# Patient Record
Sex: MALE | Race: BLACK OR AFRICAN AMERICAN | NOT HISPANIC OR LATINO | Employment: FULL TIME | RURAL
[De-identification: names, ages, dates, MRNs, and addresses within clinical notes are randomized per-mention and may not be internally consistent; named-entity substitution may affect disease eponyms.]

---

## 2022-05-20 ENCOUNTER — CLINICAL SUPPORT (OUTPATIENT)
Dept: PRIMARY CARE CLINIC | Facility: CLINIC | Age: 57
End: 2022-05-20

## 2022-05-20 DIAGNOSIS — Z02.4 ENCOUNTER FOR COMMERCIAL DRIVER MEDICAL EXAMINATION (CDME): Primary | ICD-10-CM

## 2022-05-20 PROCEDURE — 99499 UNLISTED E&M SERVICE: CPT | Mod: ,,, | Performed by: NURSE PRACTITIONER

## 2022-05-20 PROCEDURE — 99499 PR PHYSICAL - DOT/CDL: ICD-10-PCS | Mod: ,,, | Performed by: NURSE PRACTITIONER

## 2022-10-12 ENCOUNTER — HOSPITAL ENCOUNTER (OUTPATIENT)
Dept: RADIOLOGY | Facility: HOSPITAL | Age: 57
Discharge: HOME | End: 2022-10-12
Attending: ORTHOPAEDIC SURGERY
Payer: OTHER MISCELLANEOUS

## 2022-10-12 DIAGNOSIS — M25.562 LEFT KNEE PAIN, UNSPECIFIED CHRONICITY: ICD-10-CM

## 2022-10-12 PROBLEM — Z79.899 OTHER LONG TERM (CURRENT) DRUG THERAPY: Status: ACTIVE | Noted: 2022-10-12

## 2022-10-12 PROBLEM — R06.09 DYSPNEA ON EXERTION: Status: ACTIVE | Noted: 2022-10-12

## 2022-10-12 PROBLEM — I10 ESSENTIAL HYPERTENSION: Status: ACTIVE | Noted: 2022-10-12

## 2022-10-12 PROBLEM — M23.92 INTERNAL DERANGEMENT OF LEFT KNEE: Status: ACTIVE | Noted: 2022-10-12

## 2022-10-12 PROBLEM — M19.90 OSTEOARTHRITIS: Status: ACTIVE | Noted: 2022-10-12

## 2022-10-12 PROCEDURE — 73562 X-RAY EXAM OF KNEE 3: CPT | Mod: TC,LT

## 2022-11-09 PROBLEM — S83.242A ACUTE MEDIAL MENISCUS TEAR OF LEFT KNEE: Status: ACTIVE | Noted: 2022-11-09

## 2023-01-10 ENCOUNTER — OFFICE VISIT (OUTPATIENT)
Dept: ORTHOPEDICS | Facility: CLINIC | Age: 58
End: 2023-01-10
Payer: OTHER MISCELLANEOUS

## 2023-01-10 DIAGNOSIS — M23.92 INTERNAL DERANGEMENT OF LEFT KNEE: Primary | ICD-10-CM

## 2023-01-10 PROBLEM — E78.2 MIXED HYPERLIPIDEMIA: Status: ACTIVE | Noted: 2023-01-10

## 2023-01-10 PROBLEM — J45.30 MILD PERSISTENT ASTHMA WITHOUT COMPLICATION: Status: ACTIVE | Noted: 2023-01-10

## 2023-01-10 PROBLEM — N18.2 STAGE 2 CHRONIC KIDNEY DISEASE: Status: ACTIVE | Noted: 2023-01-10

## 2023-01-10 PROBLEM — R74.8 INCREASED CREATINE KINASE LEVEL: Status: ACTIVE | Noted: 2023-01-10

## 2023-01-10 PROBLEM — Z12.5 SCREENING FOR MALIGNANT NEOPLASM OF PROSTATE: Status: ACTIVE | Noted: 2023-01-10

## 2023-01-10 PROBLEM — R73.9 HYPERGLYCEMIA: Status: ACTIVE | Noted: 2023-01-10

## 2023-01-10 PROCEDURE — 99213 OFFICE O/P EST LOW 20 MIN: CPT | Mod: PBBFAC | Performed by: NURSE PRACTITIONER

## 2023-01-10 PROCEDURE — 99202 OFFICE O/P NEW SF 15 MIN: CPT | Mod: S$PBB,,, | Performed by: NURSE PRACTITIONER

## 2023-01-10 PROCEDURE — 99202 PR OFFICE/OUTPT VISIT, NEW, LEVL II, 15-29 MIN: ICD-10-PCS | Mod: S$PBB,,, | Performed by: NURSE PRACTITIONER

## 2023-01-10 RX ORDER — HYDROCODONE BITARTRATE AND ACETAMINOPHEN 10; 325 MG/1; MG/1
1 TABLET ORAL
Status: ON HOLD | COMMUNITY
End: 2023-04-25 | Stop reason: HOSPADM

## 2023-01-10 RX ORDER — METOPROLOL SUCCINATE 25 MG/1
25 TABLET, EXTENDED RELEASE ORAL
COMMUNITY
Start: 2022-12-29

## 2023-01-10 RX ORDER — FLUTICASONE FUROATE AND VILANTEROL 100; 25 UG/1; UG/1
1 POWDER RESPIRATORY (INHALATION)
COMMUNITY
Start: 2022-02-01

## 2023-01-10 RX ORDER — SPIRONOLACTONE 25 MG/1
1 TABLET ORAL
COMMUNITY
Start: 2022-07-19

## 2023-01-10 NOTE — PROGRESS NOTES
57-year-old male patient presents ambulatory to orthopedic clinic for update of charting.  He is known to orthopedic clinic been followed for left knee pain.  He was scheduled arthroscopy of his left knee.  He presents today stating he needs to change his surgery date scheduled for January 19, 2023.  States he has other medical procedures during this month and wishes to postpone surgical procedure until March of this year.  He will call back for scheduling.

## 2023-03-08 ENCOUNTER — OFFICE VISIT (OUTPATIENT)
Dept: ORTHOPEDICS | Facility: CLINIC | Age: 58
End: 2023-03-08
Payer: OTHER MISCELLANEOUS

## 2023-03-08 DIAGNOSIS — M23.92 INTERNAL DERANGEMENT OF LEFT KNEE: Primary | ICD-10-CM

## 2023-03-08 PROCEDURE — 99213 OFFICE O/P EST LOW 20 MIN: CPT | Mod: PBBFAC | Performed by: NURSE PRACTITIONER

## 2023-03-08 PROCEDURE — 99213 PR OFFICE/OUTPT VISIT, EST, LEVL III, 20-29 MIN: ICD-10-PCS | Mod: S$PBB,,, | Performed by: NURSE PRACTITIONER

## 2023-03-08 PROCEDURE — 99213 OFFICE O/P EST LOW 20 MIN: CPT | Mod: S$PBB,,, | Performed by: NURSE PRACTITIONER

## 2023-03-08 RX ORDER — BUPIVACAINE HYDROCHLORIDE 2.5 MG/ML
1 INJECTION, SOLUTION INFILTRATION; PERINEURAL
Status: DISCONTINUED | OUTPATIENT
Start: 2023-03-08 | End: 2023-03-08

## 2023-03-08 RX ORDER — TRIAMCINOLONE ACETONIDE 40 MG/ML
40 INJECTION, SUSPENSION INTRA-ARTICULAR; INTRAMUSCULAR
Status: DISCONTINUED | OUTPATIENT
Start: 2023-03-08 | End: 2023-03-08

## 2023-03-08 NOTE — PROGRESS NOTES
57-year-old male patient known to orthopedic clinic.  Has been followed for internal derangement of his left knee.  He is scheduled for left knee arthroscopy on 03/16/2023.  He was having to delay that surgical date at this time.  He presents today requesting consideration of repeat intra-articular steroid injection.  He is just shy of the 4 month timeframe to receive another injection.  He verbalizes understanding.  He understands that he has to be full 4 months.  He wishes to return orthopedic clinic later this month for repeat intra-articular steroid injection in his left knee.  Also states he is contemplating resecting his surgical date to late this month or early next month.

## 2023-03-08 NOTE — LETTER
March 30, 2023      [facility] - Orthoped[facility]  [address] [place] [id number]  Phone: [phone]  Fax: [phone]       Patient: Joe Mcgrath   YOB: 1965  Date of Visit: 03/30/2023    To Whom It May Concern:    RANDELL Mcgrath  was at [facility] on 03/08/23. The patient may return to work/school on 3/8/23 with no restrictions. If you have any questions or concerns, or if I can be of further assistance, please do not hesitate to contact me.    Sincerely,  MARIANO Rodriguez/  Medina Sol RN

## 2023-03-28 DIAGNOSIS — M23.92 INTERNAL DERANGEMENT OF LEFT KNEE: Primary | ICD-10-CM

## 2023-04-18 ENCOUNTER — OFFICE VISIT (OUTPATIENT)
Dept: ORTHOPEDICS | Facility: CLINIC | Age: 58
End: 2023-04-18
Payer: OTHER MISCELLANEOUS

## 2023-04-18 DIAGNOSIS — M23.92 INTERNAL DERANGEMENT OF LEFT KNEE: Primary | ICD-10-CM

## 2023-04-18 PROCEDURE — 99213 OFFICE O/P EST LOW 20 MIN: CPT | Mod: S$PBB,,, | Performed by: NURSE PRACTITIONER

## 2023-04-18 PROCEDURE — 99213 PR OFFICE/OUTPT VISIT, EST, LEVL III, 20-29 MIN: ICD-10-PCS | Mod: S$PBB,,, | Performed by: NURSE PRACTITIONER

## 2023-04-18 PROCEDURE — 99213 OFFICE O/P EST LOW 20 MIN: CPT | Mod: PBBFAC | Performed by: NURSE PRACTITIONER

## 2023-04-18 NOTE — PROGRESS NOTES
57-year-old male patient presents to Orthopedic Clinic.  He has been followed for internal derangement of his left knee.  He was scheduled for left knee arthroscopy on 04/25/2023.  He denies any recent injury or trauma.  Denies fever, chills or any sign or symptom of infection.  Denies change in medications.  States his only medical problems high blood pressure in the occasional sinusitis.    PE: He is noted be ambulating independently no perceptible limp.  Physical exam of his left knee skin is warm dry and intact.  No soft tissue swelling noted.  There is mild intra-articular effusion appreciated clinically.  Range of motion left knee 0° extension further flexion approximately 120°.  There was excellent ligamentous balance instability noted clinically.      Impression:  Internal derangement left knee     Plan:  Patient is scheduled for left knee arthroscopy on 04/25/2023.  He was aware that he received phone call and even a for 04/24/2023 for Ms. Ebenezer RN telling him what time is appointment is for surgery on 04/25/2023.  Also we will notify him of what medications to avoid on the morning of surgery.  Verbalized understanding.  He is instructed to bring all medications them to the surgical visit.  Verbalized understanding.  Return on 04/25/2023 as scheduled or sooner as needed.

## 2023-04-18 NOTE — PATIENT INSTRUCTIONS
Patient is scheduled for left knee arthroscopy on 04/25/2023.  He was aware that he received phone call and even a for 04/24/2023 for Ms. Ebenezer RN telling him what time is appointment is for surgery on 04/25/2023.  Also we will notify him of what medications to avoid on the morning of surgery.  Verbalized understanding.  He is instructed to bring all medications them to the surgical visit.  Verbalized understanding.  Return on 04/25/2023 as scheduled or sooner as needed.

## 2023-04-25 ENCOUNTER — ANESTHESIA EVENT (OUTPATIENT)
Dept: SURGERY | Facility: HOSPITAL | Age: 58
End: 2023-04-25
Payer: OTHER MISCELLANEOUS

## 2023-04-25 ENCOUNTER — HOSPITAL ENCOUNTER (OUTPATIENT)
Facility: HOSPITAL | Age: 58
Discharge: HOME | End: 2023-04-25
Attending: ORTHOPAEDIC SURGERY | Admitting: ORTHOPAEDIC SURGERY
Payer: OTHER MISCELLANEOUS

## 2023-04-25 ENCOUNTER — ANESTHESIA (OUTPATIENT)
Dept: SURGERY | Facility: HOSPITAL | Age: 58
End: 2023-04-25
Payer: OTHER MISCELLANEOUS

## 2023-04-25 VITALS
BODY MASS INDEX: 29.54 KG/M2 | HEIGHT: 71 IN | RESPIRATION RATE: 16 BRPM | HEART RATE: 79 BPM | OXYGEN SATURATION: 97 % | DIASTOLIC BLOOD PRESSURE: 105 MMHG | SYSTOLIC BLOOD PRESSURE: 163 MMHG | TEMPERATURE: 99 F | WEIGHT: 211 LBS

## 2023-04-25 DIAGNOSIS — S83.207A ACUTE MENISCAL TEAR OF LEFT KNEE: Primary | ICD-10-CM

## 2023-04-25 PROCEDURE — 97161 PT EVAL LOW COMPLEX 20 MIN: CPT

## 2023-04-25 PROCEDURE — 25000003 PHARM REV CODE 250: Performed by: ORTHOPAEDIC SURGERY

## 2023-04-25 PROCEDURE — 71000016 HC POSTOP RECOV ADDL HR: Performed by: ORTHOPAEDIC SURGERY

## 2023-04-25 PROCEDURE — 36000710: Performed by: ORTHOPAEDIC SURGERY

## 2023-04-25 PROCEDURE — 71000033 HC RECOVERY, INTIAL HOUR: Performed by: ORTHOPAEDIC SURGERY

## 2023-04-25 PROCEDURE — 29881 ARTHRS KNE SRG MNISECTMY M/L: CPT | Mod: LT,,, | Performed by: ORTHOPAEDIC SURGERY

## 2023-04-25 PROCEDURE — 36000711: Performed by: ORTHOPAEDIC SURGERY

## 2023-04-25 PROCEDURE — 29881 PR KNEE SCOPE SINGLE MENISECECTOMY: ICD-10-PCS | Mod: LT,,, | Performed by: ORTHOPAEDIC SURGERY

## 2023-04-25 PROCEDURE — 63600175 PHARM REV CODE 636 W HCPCS: Performed by: ANESTHESIOLOGY

## 2023-04-25 PROCEDURE — 27000510 HC BLANKET BAIR HUGGER ANY SIZE: Performed by: NURSE ANESTHETIST, CERTIFIED REGISTERED

## 2023-04-25 PROCEDURE — 25000003 PHARM REV CODE 250: Performed by: NURSE ANESTHETIST, CERTIFIED REGISTERED

## 2023-04-25 PROCEDURE — D9220A PRA ANESTHESIA: Mod: ANES,,, | Performed by: ANESTHESIOLOGY

## 2023-04-25 PROCEDURE — D9220A PRA ANESTHESIA: Mod: CRNA,,, | Performed by: NURSE ANESTHETIST, CERTIFIED REGISTERED

## 2023-04-25 PROCEDURE — D9220A PRA ANESTHESIA: ICD-10-PCS | Mod: ANES,,, | Performed by: ANESTHESIOLOGY

## 2023-04-25 PROCEDURE — 37000008 HC ANESTHESIA 1ST 15 MINUTES: Performed by: ORTHOPAEDIC SURGERY

## 2023-04-25 PROCEDURE — D9220A PRA ANESTHESIA: ICD-10-PCS | Mod: CRNA,,, | Performed by: NURSE ANESTHETIST, CERTIFIED REGISTERED

## 2023-04-25 PROCEDURE — 63600175 PHARM REV CODE 636 W HCPCS: Performed by: NURSE ANESTHETIST, CERTIFIED REGISTERED

## 2023-04-25 PROCEDURE — 37000009 HC ANESTHESIA EA ADD 15 MINS: Performed by: ORTHOPAEDIC SURGERY

## 2023-04-25 PROCEDURE — 27000655: Performed by: NURSE ANESTHETIST, CERTIFIED REGISTERED

## 2023-04-25 PROCEDURE — 27000177 HC AIRWAY, LARYNGEAL MASK: Performed by: NURSE ANESTHETIST, CERTIFIED REGISTERED

## 2023-04-25 PROCEDURE — 71000015 HC POSTOP RECOV 1ST HR: Performed by: ORTHOPAEDIC SURGERY

## 2023-04-25 PROCEDURE — 27201423 OPTIME MED/SURG SUP & DEVICES STERILE SUPPLY: Performed by: ORTHOPAEDIC SURGERY

## 2023-04-25 PROCEDURE — 27000284 HC CANNULA NASAL: Performed by: NURSE ANESTHETIST, CERTIFIED REGISTERED

## 2023-04-25 RX ORDER — ACETAMINOPHEN 500 MG
1000 TABLET ORAL EVERY 6 HOURS PRN
Status: DISCONTINUED | OUTPATIENT
Start: 2023-04-25 | End: 2023-04-25 | Stop reason: HOSPADM

## 2023-04-25 RX ORDER — ONDANSETRON 2 MG/ML
INJECTION INTRAMUSCULAR; INTRAVENOUS
Status: DISCONTINUED | OUTPATIENT
Start: 2023-04-25 | End: 2023-04-25

## 2023-04-25 RX ORDER — HYDROCODONE BITARTRATE AND ACETAMINOPHEN 5; 325 MG/1; MG/1
1 TABLET ORAL EVERY 6 HOURS PRN
Qty: 28 TABLET | Refills: 0
Start: 2023-04-25 | End: 2023-05-02

## 2023-04-25 RX ORDER — SODIUM CHLORIDE 9 MG/ML
INJECTION, SOLUTION INTRAVENOUS CONTINUOUS
Status: DISCONTINUED | OUTPATIENT
Start: 2023-04-25 | End: 2023-04-25 | Stop reason: HOSPADM

## 2023-04-25 RX ORDER — ONDANSETRON 2 MG/ML
4 INJECTION INTRAMUSCULAR; INTRAVENOUS DAILY PRN
Status: DISCONTINUED | OUTPATIENT
Start: 2023-04-25 | End: 2023-04-25 | Stop reason: HOSPADM

## 2023-04-25 RX ORDER — DEXAMETHASONE SODIUM PHOSPHATE 4 MG/ML
INJECTION, SOLUTION INTRA-ARTICULAR; INTRALESIONAL; INTRAMUSCULAR; INTRAVENOUS; SOFT TISSUE
Status: DISCONTINUED | OUTPATIENT
Start: 2023-04-25 | End: 2023-04-25

## 2023-04-25 RX ORDER — HYDROCODONE BITARTRATE AND ACETAMINOPHEN 10; 325 MG/1; MG/1
1 TABLET ORAL EVERY 4 HOURS PRN
Status: DISCONTINUED | OUTPATIENT
Start: 2023-04-25 | End: 2023-04-25 | Stop reason: HOSPADM

## 2023-04-25 RX ORDER — MIDAZOLAM HYDROCHLORIDE 1 MG/ML
INJECTION INTRAMUSCULAR; INTRAVENOUS
Status: DISCONTINUED | OUTPATIENT
Start: 2023-04-25 | End: 2023-04-25

## 2023-04-25 RX ORDER — HYDROMORPHONE HYDROCHLORIDE 2 MG/ML
0.5 INJECTION, SOLUTION INTRAMUSCULAR; INTRAVENOUS; SUBCUTANEOUS EVERY 5 MIN PRN
Status: COMPLETED | OUTPATIENT
Start: 2023-04-25 | End: 2023-04-25

## 2023-04-25 RX ORDER — ONDANSETRON 4 MG/1
8 TABLET, ORALLY DISINTEGRATING ORAL EVERY 8 HOURS PRN
Status: DISCONTINUED | OUTPATIENT
Start: 2023-04-25 | End: 2023-04-25 | Stop reason: HOSPADM

## 2023-04-25 RX ORDER — DIPHENHYDRAMINE HYDROCHLORIDE 50 MG/ML
25 INJECTION INTRAMUSCULAR; INTRAVENOUS EVERY 6 HOURS PRN
Status: DISCONTINUED | OUTPATIENT
Start: 2023-04-25 | End: 2023-04-25 | Stop reason: HOSPADM

## 2023-04-25 RX ORDER — PROPOFOL 10 MG/ML
INJECTION, EMULSION INTRAVENOUS
Status: DISCONTINUED | OUTPATIENT
Start: 2023-04-25 | End: 2023-04-25

## 2023-04-25 RX ORDER — HYDROCODONE BITARTRATE AND ACETAMINOPHEN 5; 325 MG/1; MG/1
1 TABLET ORAL EVERY 4 HOURS PRN
Status: DISCONTINUED | OUTPATIENT
Start: 2023-04-25 | End: 2023-04-25 | Stop reason: HOSPADM

## 2023-04-25 RX ORDER — LIDOCAINE HYDROCHLORIDE 20 MG/ML
INJECTION, SOLUTION EPIDURAL; INFILTRATION; INTRACAUDAL; PERINEURAL
Status: DISCONTINUED | OUTPATIENT
Start: 2023-04-25 | End: 2023-04-25

## 2023-04-25 RX ORDER — MORPHINE SULFATE 10 MG/ML
4 INJECTION INTRAMUSCULAR; INTRAVENOUS; SUBCUTANEOUS EVERY 5 MIN PRN
Status: DISCONTINUED | OUTPATIENT
Start: 2023-04-25 | End: 2023-04-25 | Stop reason: HOSPADM

## 2023-04-25 RX ORDER — MEPERIDINE HYDROCHLORIDE 25 MG/ML
25 INJECTION INTRAMUSCULAR; INTRAVENOUS; SUBCUTANEOUS EVERY 10 MIN PRN
Status: DISCONTINUED | OUTPATIENT
Start: 2023-04-25 | End: 2023-04-25 | Stop reason: HOSPADM

## 2023-04-25 RX ORDER — SODIUM CHLORIDE, SODIUM LACTATE, POTASSIUM CHLORIDE, CALCIUM CHLORIDE 600; 310; 30; 20 MG/100ML; MG/100ML; MG/100ML; MG/100ML
INJECTION, SOLUTION INTRAVENOUS CONTINUOUS PRN
Status: DISCONTINUED | OUTPATIENT
Start: 2023-04-25 | End: 2023-04-25

## 2023-04-25 RX ORDER — HYDROMORPHONE HYDROCHLORIDE 2 MG/ML
INJECTION, SOLUTION INTRAMUSCULAR; INTRAVENOUS; SUBCUTANEOUS
Status: DISCONTINUED | OUTPATIENT
Start: 2023-04-25 | End: 2023-04-25

## 2023-04-25 RX ORDER — FENTANYL CITRATE 50 UG/ML
INJECTION, SOLUTION INTRAMUSCULAR; INTRAVENOUS
Status: DISCONTINUED | OUTPATIENT
Start: 2023-04-25 | End: 2023-04-25

## 2023-04-25 RX ORDER — PROMETHAZINE HYDROCHLORIDE 25 MG/1
25 TABLET ORAL EVERY 6 HOURS PRN
Status: DISCONTINUED | OUTPATIENT
Start: 2023-04-25 | End: 2023-04-25 | Stop reason: HOSPADM

## 2023-04-25 RX ADMIN — HYDROMORPHONE HYDROCHLORIDE 0.5 MG: 2 INJECTION, SOLUTION INTRAMUSCULAR; INTRAVENOUS; SUBCUTANEOUS at 03:04

## 2023-04-25 RX ADMIN — CEFAZOLIN 2 G: 1 INJECTION, POWDER, FOR SOLUTION INTRAMUSCULAR; INTRAVENOUS; PARENTERAL at 02:04

## 2023-04-25 RX ADMIN — PROPOFOL 100 MG: 10 INJECTION, EMULSION INTRAVENOUS at 02:04

## 2023-04-25 RX ADMIN — PROPOFOL 200 MG: 10 INJECTION, EMULSION INTRAVENOUS at 02:04

## 2023-04-25 RX ADMIN — MIDAZOLAM HYDROCHLORIDE 2 MG: 1 INJECTION, SOLUTION INTRAMUSCULAR; INTRAVENOUS at 02:04

## 2023-04-25 RX ADMIN — FENTANYL CITRATE 25 MCG: 50 INJECTION INTRAMUSCULAR; INTRAVENOUS at 02:04

## 2023-04-25 RX ADMIN — SODIUM CHLORIDE, POTASSIUM CHLORIDE, SODIUM LACTATE AND CALCIUM CHLORIDE: 600; 310; 30; 20 INJECTION, SOLUTION INTRAVENOUS at 02:04

## 2023-04-25 RX ADMIN — DEXAMETHASONE SODIUM PHOSPHATE 4 MG: 4 INJECTION, SOLUTION INTRA-ARTICULAR; INTRALESIONAL; INTRAMUSCULAR; INTRAVENOUS; SOFT TISSUE at 02:04

## 2023-04-25 RX ADMIN — LIDOCAINE HYDROCHLORIDE 70 MG: 20 INJECTION, SOLUTION EPIDURAL; INFILTRATION; INTRACAUDAL; PERINEURAL at 02:04

## 2023-04-25 RX ADMIN — FENTANYL CITRATE 50 MCG: 50 INJECTION INTRAMUSCULAR; INTRAVENOUS at 02:04

## 2023-04-25 RX ADMIN — HYDROCODONE BITARTRATE AND ACETAMINOPHEN 1 TABLET: 10; 325 TABLET ORAL at 05:04

## 2023-04-25 RX ADMIN — ONDANSETRON 4 MG: 2 INJECTION INTRAMUSCULAR; INTRAVENOUS at 02:04

## 2023-04-25 NOTE — DISCHARGE SUMMARY
"[facility] - Orthopedic Periop Services  Discharge Note  Short Stay    Procedure(s) (LRB):  ARTHROSCOPY, KNEE (Left)  ARTHROSCOPY, KNEE, WITH MENISCECTOMY (Left)      OUTCOME: Patient tolerated treatment/procedure well without complication and is now ready for discharge.    DISPOSITION: Home or Self Care    FINAL DIAGNOSIS:  <principal problem not specified>    FOLLOWUP: In clinic    DISCHARGE INSTRUCTIONS:    Discharge Procedure Orders   CRUTCHES FOR HOME USE     Order Specific Question Answer Comments   Type: Axillary    Height: 5' 11" (1.803 m)    Weight: 95.7 kg (211 lb)    Length of need (1-99 months): 2      Diet general     Keep surgical extremity elevated     Ice to affected area   Order Comments: using barrier between ice and skin (specify duration&frequency)     Change dressing (specify)   Order Comments: Dressing change: one time per day beginning 72 hours post op.     Call MD for:  temperature >100.4     Call MD for:  persistent nausea and vomiting     Call MD for:  severe uncontrolled pain     Call MD for:  difficulty breathing, headache or visual disturbances     Call MD for:  redness, tenderness, or signs of infection (pain, swelling, redness, odor or green/yellow discharge around incision site)     Call MD for:  hives     Call MD for:  persistent dizziness or light-headedness     Call MD for:  extreme fatigue     Remove dressing in 48 hours     Activity as tolerated     Shower on day dressing removed (No bath)     Weight bearing as tolerated        TIME SPENT ON DISCHARGE: 15 minutes  "

## 2023-04-25 NOTE — BRIEF OP NOTE
"[facility] - Orthopedic Periop Services  Brief Operative Note    Surgery Date: 4/25/2023     Surgeon(s) and Role:     * William Fernandez MD - Primary    Assisting Surgeon: None    Pre-op Diagnosis:  Internal derangement of left knee [M23.92]    Post-op Diagnosis:  Post-Op Diagnosis Codes:     * Internal derangement of left knee [M23.92]    Procedure(s) (LRB):  ARTHROSCOPY, KNEE (Left)  ARTHROSCOPY, KNEE, WITH MENISCECTOMY (Left)    Anesthesia: general    Description of the findings of the procedure(s): See Op Note     Estimated Blood Loss: 5 mL         Specimens:   Specimen (24h ago, onward)      None              Discharge Note    OUTCOME: Patient tolerated treatment/procedure well without complication and is now ready for discharge.    DISPOSITION: Home or Self Care    FINAL DIAGNOSIS:  <principal problem not specified>    FOLLOWUP: In clinic    DISCHARGE INSTRUCTIONS:    Discharge Procedure Orders   CRUTCHES FOR HOME USE     Order Specific Question Answer Comments   Type: Axillary    Height: 5' 11" (1.803 m)    Weight: 95.7 kg (211 lb)    Length of need (1-99 months): 2      Diet general     Keep surgical extremity elevated     Ice to affected area   Order Comments: using barrier between ice and skin (specify duration&frequency)     Change dressing (specify)   Order Comments: Dressing change: one time per day beginning 72 hours post op.     Call MD for:  temperature >100.4     Call MD for:  persistent nausea and vomiting     Call MD for:  severe uncontrolled pain     Call MD for:  difficulty breathing, headache or visual disturbances     Call MD for:  redness, tenderness, or signs of infection (pain, swelling, redness, odor or green/yellow discharge around incision site)     Call MD for:  hives     Call MD for:  persistent dizziness or light-headedness     Call MD for:  extreme fatigue     Remove dressing in 48 hours     Activity as tolerated     Shower on day dressing removed (No bath)     Weight " bearing as tolerated

## 2023-04-25 NOTE — SUBJECTIVE & OBJECTIVE
Past Medical History:   Diagnosis Date    Asthma     Chronic back pain     HTN (hypertension)        Past Surgical History:   Procedure Laterality Date    BACK SURGERY         Review of patient's allergies indicates:  No Known Allergies    No current facility-administered medications for this encounter.     Current Outpatient Medications   Medication Sig    amLODIPine (NORVASC) 10 MG tablet Take 10 mg by mouth once daily.    diclofenac sodium (VOLTAREN) 1 % Gel Apply 4 g topically 4 (four) times daily.    fluticasone furoate-vilanteroL (BREO ELLIPTA) 100-25 mcg/dose diskus inhaler 1 puff.    gabapentin (NEURONTIN) 300 MG capsule Take 600 mg by mouth once daily.    HYDROcodone-acetaminophen (NORCO)  mg per tablet 1 tablet as needed.    lisinopriL (PRINIVIL,ZESTRIL) 40 MG tablet Take 40 mg by mouth once daily.    metoprolol succinate (TOPROL-XL) 25 MG 24 hr tablet Take 25 mg by mouth.    spironolactone (ALDACTONE) 25 MG tablet 1 tablet.     Family History       Problem Relation (Age of Onset)    Diabetes Mother    Heart disease Mother, Father    Hypertension Mother    Osteoarthritis Mother          Tobacco Use    Smoking status: Never    Smokeless tobacco: Never   Substance and Sexual Activity    Alcohol use: Not Currently    Drug use: Not on file    Sexual activity: Not on file     Review of Systems   Constitutional: Negative.   Objective:     Vital Signs (Most Recent):    Vital Signs (24h Range):  BP: ()/()   Arterial Line BP: ()/()            There is no height or weight on file to calculate BMI.    No intake or output data in the 24 hours ending 04/24/23 2240    General    Vitals reviewed.  Constitutional: He is oriented to person, place, and time. He appears well-developed and well-nourished.   HENT:   Head: Normocephalic and atraumatic.   Eyes: Pupils are equal, round, and reactive to light.   Cardiovascular:  Normal rate, regular rhythm and normal heart sounds.            Pulmonary/Chest: Effort normal.    Abdominal: Soft. Bowel sounds are normal.   Neurological: He is alert and oriented to person, place, and time.   Psychiatric: He has a normal mood and affect. His behavior is normal.           Right Knee Exam   Right knee exam is normal.    Left Knee Exam     Inspection   Effusion: present    Tenderness   The patient tender to palpation of the medial joint line.    Tests   Meniscus   Aydin:  Medial - positive   Stability   Lachman: normal (-1 to 2mm)   PCL-Posterior Drawer: normal (0 to 2mm)  MCL - Valgus: normal (0 to 2mm)  LCL - Varus: normal (0 to 2mm)  Pivot Shift: normal (Equal)    Other   Sensation: normal    Muscle Strength   Left Lower Extremity   Hip Abduction: 5/5   Quadriceps:  5/5   Hamstrin/5     Vascular Exam       Left Pulses  Dorsalis Pedis:      2+  Posterior Tibial:      2+        Significant Labs: All pertinent labs within the past 24 hours have been reviewed.    Significant Imaging: I have reviewed all pertinent imaging results/findings.

## 2023-04-25 NOTE — HPI
Chief Complaint:Left knee pain  History:    Joe Mcgrath is a 57 y.o. male seen  for evaluation of left knee pain.  Symptoms began acutely in May of this year.  He was on the job for the [employer].  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  He pivoted quickly and experience a sudden sharp pain and pop along the medial aspect of his left knee.  He denies prior left knee problems.  He has continued to work but has persistent medial knee pain.  Has a negative family history for for replacement does have history of arthritis with respect to his parents.  X-rays left knee three views:  10/12/2022 she is bones well mineralized.  There is moderate severe narrowing medial joint space with subchondral medial tibial plateau sclerosis.  No evidence of acute fracture, dislocation or pathologic bone.  Impression: Internal disruption Left knee  Plan:Right knee arthroscopy

## 2023-04-25 NOTE — OR NURSING
1521 PT TO PACU AWAKE AND ALERT. RESP EVEN AND UNLABORED. IV INFUSING. DRESSING TO L KNEE C/D/I. NO BLEEDING NOTED. VSS. SAFETY MEASURES IN PLACE.    1528 PT C/O PAIN 8/10. DILAUDID GIVEN, SEE MAR.     1533 PAIN STILL 8/10. DILAUDID GIVEN. SEE MAR.     1538 PAIN 8/10. DILAUDID GIVEN. SEE MAR.     1543 PAIN STILL 8/10. DILAUDID GIVEN. SEE MAR. PT AWAKE AND ALERT. RESP EVEN AND UNLABORED. VSS.     1551 PT RESTING QUIETLY, STATES PAIN STILL 8/10 BUT DENIES WANTING FURTHER IV NARCOTICS AT THIS TIME. DRESSING TO L KNEE C/D/I.     1558 OUT OF PACU    1600 PT TRANSFERRED BACK TO ASC 24. RELEASED TO BLACK ORTEGA RN. PT AWAKE AND ALERT. RESP EVEN AND UNLABORED. IV INFUSING. DRESSING TO L KNEE C/D/I. NO BLEEDING NOTED. VS: /98, HR 94, RR 12, SPO2 98% ON RA. SAFETY MEASURES IN PLACE. FAMILY AT BEDSIDE.

## 2023-04-25 NOTE — TRANSFER OF CARE
"Anesthesia Transfer of Care Note    Patient: Joe Mcgrath    Procedure(s) Performed: Procedure(s) (LRB):  ARTHROSCOPY, KNEE (Left)  ARTHROSCOPY, KNEE, WITH MENISCECTOMY (Left)    Patient location: PACU    Anesthesia Type: general    Transport from OR: Transported from OR on room air with adequate spontaneous ventilation    Post pain: adequate analgesia    Post assessment: no apparent anesthetic complications and tolerated procedure well    Post vital signs: stable    Level of consciousness: alert and awake    Nausea/Vomiting: no nausea/vomiting    Complications: none    Transfer of care protocol was followed      Last vitals:   Visit Vitals  BP (!) 140/96   Pulse 95   Temp 36.9 °C (98.5 °F) (Oral)   Resp (!) 24   Ht 5' 11" (1.803 m)   Wt 95.7 kg (211 lb)   SpO2 99%   BMI 29.43 kg/m²     "

## 2023-04-25 NOTE — ANESTHESIA PROCEDURE NOTES
Intubation    Date/Time: 4/25/2023 2:35 PM  Performed by: Lm Pizarro Jr., CRNA  Authorized by: Mannie Whitehead MD     Intubation:     Induction:  Intravenous    Intubated:  Postinduction    Mask Ventilation:  Easy mask    Attempted By:  CRNA    Method of Intubation:  Direct    Difficult Airway Encountered?: No      Complications:  None    Airway Device:  Supraglottic airway/LMA    Airway Device Size:  5.0    Style/Cuff Inflation:  Cuffed (inflated to minimal occlusive pressure)    Placement Verified By:  Capnometry    Complicating Factors:  None    Findings Post-Intubation:  Atraumatic/condition of teeth unchanged

## 2023-04-25 NOTE — PLAN OF CARE
Problem: Physical Therapy  Goal: Physical Therapy Goal  Description: Short term goals    To ensure safe transition home:    Patient will demo safe transfers and gait with crutches TTWB left LE  Patient will demo safe stair negotiation with crutches TTWB left LE  Patient will demo understanding of illustrated HEP and cryo-therapy management      Long term goals    Patient will resume all prior ADLs   Outcome: Met     Patient demonstrated safe mobility and stair negotiation using crutches TTWB left and verbalizes understanding to progress to weight bearing as tolerated on 3rd day post-op. Patient safe for d/c home with family assist PRN.

## 2023-04-25 NOTE — H&P
[facility] - [facility]opedic Periop Services  Orthopedics  H&P    Patient Name: Joe Mcgrath  MRN: [id number]  Admission Date: 4/25/2023  Primary Care Provider: Sunil Rowell DDS    Patient information was obtained from patient and ER records.     Subjective:     Principal Problem:<principal problem not specified>    Chief Complaint: No chief complaint on file.       HPI:   Chief Complaint:Left knee pain  History:    Joe Mcgrath is a 57 y.o. male seen  for evaluation of left knee pain.  Symptoms began acutely in May of this year.  He was on the job for the [employer].  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  He pivoted quickly and experience a sudden sharp pain and pop along the medial aspect of his left knee.  He denies prior left knee problems.  He has continued to work but has persistent medial knee pain.  Has a negative family history for for replacement does have history of arthritis with respect to his parents.  X-rays left knee three views:  10/12/2022 she is bones well mineralized.  There is moderate severe narrowing medial joint space with subchondral medial tibial plateau sclerosis.  No evidence of acute fracture, dislocation or pathologic bone.  Impression: Internal disruption Left knee  Plan:Right knee arthroscopy              Past Medical History:   Diagnosis Date    Asthma     Chronic back pain     HTN (hypertension)        Past Surgical History:   Procedure Laterality Date    BACK SURGERY         Review of patient's allergies indicates:  No Known Allergies    No current facility-administered medications for this encounter.     Current Outpatient Medications   Medication Sig    amLODIPine (NORVASC) 10 MG tablet Take 10 mg by mouth once daily.    diclofenac sodium (VOLTAREN) 1 % Gel Apply 4 g topically 4 (four) times daily.    fluticasone furoate-vilanteroL (BREO ELLIPTA) 100-25 mcg/dose diskus inhaler 1 puff.    gabapentin (NEURONTIN) 300 MG  capsule Take 600 mg by mouth once daily.    HYDROcodone-acetaminophen (NORCO)  mg per tablet 1 tablet as needed.    lisinopriL (PRINIVIL,ZESTRIL) 40 MG tablet Take 40 mg by mouth once daily.    metoprolol succinate (TOPROL-XL) 25 MG 24 hr tablet Take 25 mg by mouth.    spironolactone (ALDACTONE) 25 MG tablet 1 tablet.     Family History       Problem Relation (Age of Onset)    Diabetes Mother    Heart disease Mother, Father    Hypertension Mother    Osteoarthritis Mother          Tobacco Use    Smoking status: Never    Smokeless tobacco: Never   Substance and Sexual Activity    Alcohol use: Not Currently    Drug use: Not on file    Sexual activity: Not on file     Review of Systems   Constitutional: Negative.   Objective:     Vital Signs (Most Recent):    Vital Signs (24h Range):  BP: ()/()   Arterial Line BP: ()/()            There is no height or weight on file to calculate BMI.    No intake or output data in the 24 hours ending 04/24/23 2240    General    Vitals reviewed.  Constitutional: He is oriented to person, place, and time. He appears well-developed and well-nourished.   HENT:   Head: Normocephalic and atraumatic.   Eyes: Pupils are equal, round, and reactive to light.   Cardiovascular:  Normal rate, regular rhythm and normal heart sounds.            Pulmonary/Chest: Effort normal.   Abdominal: Soft. Bowel sounds are normal.   Neurological: He is alert and oriented to person, place, and time.   Psychiatric: He has a normal mood and affect. His behavior is normal.           Right Knee Exam   Right knee exam is normal.    Left Knee Exam     Inspection   Effusion: present    Tenderness   The patient tender to palpation of the medial joint line.    Tests   Meniscus   Aydin:  Medial - positive   Stability   Lachman: normal (-1 to 2mm)   PCL-Posterior Drawer: normal (0 to 2mm)  MCL - Valgus: normal (0 to 2mm)  LCL - Varus: normal (0 to 2mm)  Pivot Shift: normal (Equal)    Other   Sensation:  normal    Muscle Strength   Left Lower Extremity   Hip Abduction: 5/5   Quadriceps:  5/5   Hamstrin/5     Vascular Exam       Left Pulses  Dorsalis Pedis:      2+  Posterior Tibial:      2+        Significant Labs: All pertinent labs within the past 24 hours have been reviewed.    Significant Imaging: I have reviewed all pertinent imaging results/findings.    Assessment/Plan:     No notes have been filed under this hospital service.  Service: Orthopedic Surgery      William Fernandez MD  Orthopedics  [facility] - Orthopedic Periop Services

## 2023-04-25 NOTE — DISCHARGE INSTRUCTIONS
Ankle Pump        Bend ankles up and down, alternating feet.  Repeat 30 times. Do 3 sessions per day.         KNEE: Extension (Isometric)        Lie on back, legs straight. Tighten left quadriceps (front of thigh) by pushing back of knee into surface. Hold 5 to 10 seconds, relax. Repeat 30 times. Do 3 sessions per day.        HIP / KNEE: Flexion, Heel Slides - Supine        Slide left heel up toward buttocks, keeping leg in straight line, straighten leg fully. Repeat 30 times. Do 3 sessions per day.        Straight Leg Raise        Bend right leg. Raise left leg with knee locked. Exhale and tighten thigh muscles while raising leg. Lower your leg with control.  Repeat 30 times. Do 3 sessions per day.       Copyright © [employer]. All rights reserved.

## 2023-04-25 NOTE — OP NOTE
[facility] - Orthopedic [facility]  Surgery Department  Operative Note    SUMMARY     Date of Procedure: 4/25/2023     Procedure: Procedure(s) (LRB):  ARTHROSCOPY, KNEE (Left)  ARTHROSCOPY, KNEE, WITH MENISCECTOMY (Left)     Surgeon(s) and Role:     * William Fernandez MD - Primary    Assisting Surgeon: None    Pre-Operative Diagnosis: Internal derangement of left knee [M23.92]    Post-Operative Diagnosis: Post-Op Diagnosis Codes:     * Internal derangement of left knee [M23.92]    Anesthesia: general    Technical Procedures Used:  Left knee arthroscopy           DEPARTMENT OF ORTHOPEDIC SURGERY                OPERATIVE REPORT     NAME:  Joe Mcgrath  MRN: [id number]               DATE OF SURGERY:  04/25/2023     PREOPERATIVE DIAGNOSIS:  left knee internal derangement    POSTOPERATIVE DIAGNOSIS:  Degenerative tear mesial root posterior horn medial meniscus, 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    PROCEDURE:  Examination under anesthesia, arthroscopy with intraarticular probing, M SP excision, shaving medial femoral condyle, partial medial meniscectomy    PROCEDURE IN DETAIL:  The patient was taken to the operating room and placed in the supine position.  After adequate level of general anesthesia had been achieved (See Anesthesia Note) patients left knee was examined.  There was 1+ intraarticular effusion.  Knee range of motion was 0-125 degrees of flexion.  Lachman exam was negative as is the FRD.  There is no medial or ligamentous instability appreciated.  Aydin test produced intermittent popping felt to emanate from the lateral aspect of the joint.  Now, the leftlower extremity was scrubbed with Betadine and draped in sterile fashion.  The left lower extremity was elevated, exsanguinated with a Bill bandage.  A pneumatic tourniquet about the upper thigh, to pressure of 300 millimeters of Mercury.  The operation was begun by making a small stab wound  about the superolateral aspect of the right patella.  A trocar was introduced into the suprapatellar pouch and the knee was distended with sterile saline.  Second and third stab wounds were made about the medial and lateral aspects of the patellar tendon for introduction of the arthroscopy camera and intraarticular probe.  Now a systematic evaluation of the knee was carried out.  Inspection of the suprapatellar pouch was unremarkable.  Exam of the patellofemoral joint revealed normal cartilaginous surfaces in good tracking in the midline.  Lateral gutter was unremarkable with no loose bodies encountered.  Exam of the medial aspect revealed a large medial synovial plica.  This was excised with a shaver and contoured with the radiofrequency Wand.  There was grade 2-3/4 DJD involving the weight-bearing articular surface of the medial femoral condyle.  This was lightly debrided with shaver.  A degenerative tear of the mesial root of the posterior horn of the medial meniscus was present.  The torn portion meniscus was excised as were smoothed with a shaver and contoured with the radiofrequency Wand.  Examiner condylar notch revealed intact anterior and posterior cruciate ligaments.  Exam lateral joint showed normal femoral tibial cartilage and intact lateral meniscus to probing.  Now, the tourniquet was let down.  Hemostasis was achieved with Bovie electrocautery.  Knee joint was irrigated copiously with antibiotic solution and arthroscope withdrawn.  The stab wounds were reapproximated with interrupted 4-0 suture.  The wounds were dressed sterilely.  The patient was taken to the recovery room in satisfactory condition.  ESTIMATED BLOOD LOSS:  5ccs.   Tourniquet time: 18 minutes.  The patient received Ancef antibiotic intravenously prior to the procedure.      William Fernandez             Complications: No    Estimated Blood Loss (EBL): 5 mL           Implants: * No implants in log *    Specimens:   Specimen (24h ago,  onward)      None                    Condition: Good    Disposition: PACU - hemodynamically stable.    Attestation: I was present and scrubbed for the entire procedure.

## 2023-04-25 NOTE — PT/OT/SLP EVAL
"Physical Therapy Evaluation and Discharge Note    Patient Name:  Joe Mcgrath   MRN:  [id number]    Recommendations:     Discharge Recommendations: home  Discharge Equipment Recommendations: crutches, axillary (issued and fit to patient)   Barriers to discharge: None    Assessment:     Joe Mcgrath is a 57 y.o. male admitted with a medical diagnosis of left knee meniscal tear.  At this time, patient and family are able to demonstrate competence with post op care and does not require further acute PT services.     Recent Surgery: Procedure(s) (LRB):  ARTHROSCOPY, KNEE (Left)  ARTHROSCOPY, KNEE, WITH MENISCECTOMY (Left) Day of Surgery    Plan:     During this hospitalization, patient does not require further acute PT services.  Please re-consult if situation changes.      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  Equipment used at home: none.  DME owned (not currently used): none.  Upon discharge, patient will have assistance from family.    Objective:     Communicated with Alexia Aj RN prior to session.  Patient found supine with blood pressure cuff, pulse ox (continuous), peripheral IV upon PT entry to room.    General Precautions: Standard, fall    Orthopedic Precautions:LLE toe touch weight bearing   Braces: N/A  Respiratory Status: Room air    Exams:  Cognitive Exam:  Patient is oriented to Person, Place, Time, and Situation  Sensation:  "   -       Intact  RLE ROM: WNL  RLE Strength: WNL  LLE ROM: Deficits: hip WFL; knee 0-80, ankle WFl  LLE Strength: Deficits: hip 5/5; knee 4-/5; ankle 5/5    Functional Mobility:  Bed Mobility:     Supine to Sit: modified independence  Sit to Supine: modified independence  Transfers:     Sit to Stand:  stand by assistance with axillary crutches and verbal cues for sequencing; pt able to demo mod I sit to stand after coaching  Gait: 60' x 2 trials using crutches TTWB left; verbal cues for sequencing; step to pattern, no LOB or lightheadedness  Stairs:  Pt ascended/descended 4 stair(s) with Axillary crutches with left handrail with Contact Guard Assistance and verbal cues for sequencing. Patient verbalizes confidence with stair negotiation after training.     AM-PAC 6 CLICK MOBILITY  Total Score:23       Treatment and Education:  Review of illustrated HEP and weight bearing progression per Dr Fernandez protocol. Patient and family verbalize understanding of same    AM-PAC 6 CLICK MOBILITY  Total Score:23     Patient left supine with all lines intact, call button in reach, Alexia Aj RN notified, and brother present.    GOALS:   Multidisciplinary Problems       Physical Therapy Goals       Not on file              Multidisciplinary Problems (Resolved)          Problem: Physical Therapy    Goal Priority Disciplines Outcome Goal Variances Interventions   Physical Therapy Goal   (Resolved)     PT, PT/OT Met     Description: Short term goals    To ensure safe transition home:    Patient will demo safe transfers and gait with crutches TTWB left LE  Patient will demo safe stair negotiation with crutches TTWB left LE  Patient will demo understanding of illustrated HEP and cryo-therapy management      Long term goals    Patient will resume all prior ADLs                        History:     Past Medical History:   Diagnosis Date    Asthma     Chronic back pain     HTN (hypertension)        Past Surgical History:    Procedure Laterality Date    BACK SURGERY         Time Tracking:     PT Received On: 04/25/23  PT Start Time: 1727     PT Stop Time: 1752  PT Total Time (min): 25 min     Billable Minutes: Evaluation Low complexity      04/25/2023

## 2023-04-25 NOTE — ANESTHESIA POSTPROCEDURE EVALUATION
Anesthesia Post Evaluation    Patient: Joe Mcgrath    Procedure(s) Performed: Procedure(s) (LRB):  ARTHROSCOPY, KNEE (Left)  ARTHROSCOPY, KNEE, WITH MENISCECTOMY (Left)    Final Anesthesia Type: general      Patient location during evaluation: PACU  Patient participation: Yes- Able to Participate  Level of consciousness: awake and alert  Post-procedure vital signs: reviewed and stable  Pain management: adequate  Airway patency: patent  BEAU mitigation strategies: Multimodal analgesia  PONV status at discharge: No PONV  Anesthetic complications: no      Cardiovascular status: blood pressure returned to baseline  Respiratory status: unassisted  Hydration status: euvolemic  Follow-up not needed.          Vitals Value Taken Time   /96 04/25/23 1555   Temp 36.9 °C (98.5 °F) 04/25/23 1524   Pulse 84 04/25/23 1556   Resp 16 04/25/23 1556   SpO2 96 % 04/25/23 1556   Vitals shown include unvalidated device data.      Event Time   Out of Recovery 15:58:00         Pain/Nisa Score: Pain Rating Prior to Med Admin: 8 (4/25/2023  3:43 PM)  Nisa Score: 10 (4/25/2023  3:51 PM)

## 2023-05-04 ENCOUNTER — TELEPHONE (OUTPATIENT)
Dept: ORTHOPEDICS | Facility: CLINIC | Age: 58
End: 2023-05-04

## 2023-05-11 ENCOUNTER — OFFICE VISIT (OUTPATIENT)
Dept: ORTHOPEDICS | Facility: CLINIC | Age: 58
End: 2023-05-11
Payer: OTHER MISCELLANEOUS

## 2023-05-11 DIAGNOSIS — Z98.890 S/P LEFT KNEE ARTHROSCOPY: Primary | ICD-10-CM

## 2023-05-11 DIAGNOSIS — M17.12 PRIMARY OSTEOARTHRITIS OF LEFT KNEE: ICD-10-CM

## 2023-05-11 PROCEDURE — 99024 POSTOP FOLLOW-UP VISIT: CPT | Mod: ,,, | Performed by: NURSE PRACTITIONER

## 2023-05-11 PROCEDURE — 99213 OFFICE O/P EST LOW 20 MIN: CPT | Mod: PBBFAC | Performed by: NURSE PRACTITIONER

## 2023-05-11 PROCEDURE — 99024 PR POST-OP FOLLOW-UP VISIT: ICD-10-PCS | Mod: ,,, | Performed by: NURSE PRACTITIONER

## 2023-05-11 NOTE — PATIENT INSTRUCTIONS
Safety guidelines and activity restrictions are discussed with the patient.  Verbalized understanding.  Sutures are removed Steri-Strips are applied.  We discussed arthrocentesis followed by installation of steroid.  Also discussed it would have to wait to lose at least 4 weeks postop.  He he is in agreement.  We will have return to orthopedic clinic in 2 weeks follow-up with me for re-evaluation left knee or sooner Dr. Fernandez as needed.

## 2023-05-11 NOTE — PROGRESS NOTES
57-year-old male patient presents ambulatory to orthopedic clinic re-evaluation of his left knee.  He is known to orthopedic having undergone left knee arthroscopy 2 weeks ago where he was noted to have degenerative tearing of the posterior horn of the medial meniscus with grade 2 to 3/4 DJD of the medial femoral condyle articular surface with M SP enlargement.  He underwent arthroscopic examination with intra-articular probing, M SP excision, shaving of the medial femoral condyle and partial medial meniscectomy.  He reports improvement pain symptoms.  He does state he has a sensation that something is moving in his knee when he fully extends.  Denies injury, trauma, fever, chills or signs or symptom of infection.      PE:  Physical exam he is noted be ambulating with a single cane in his right hand.  Physical exam left knee skin is warm, and dry.  Portal sites are open to air and healing without sign or symptom of infection.  Sutures are noted.  There is a 2+ intra-articular effusion appreciated clinically.  Range of motion of his left knee is 0° extension with further flexion to approximately 100°.  There was excellent ligamentous balance instability noted clinically.      Impression:  1.) 2 weeks following left knee arthroscopy 2.) mild DJD changes left knee     Plan:  Safety guidelines and activity restrictions are discussed with the patient.  Verbalized understanding.  Sutures are removed Steri-Strips are applied.  We discussed arthrocentesis followed by installation of steroid.  Also discussed it would have to wait to lose at least 4 weeks postop.  He he is in agreement.  We will have return to orthopedic clinic in 2 weeks follow-up with me for re-evaluation left knee or sooner Dr. Fernandez as needed.

## 2023-05-25 ENCOUNTER — OFFICE VISIT (OUTPATIENT)
Dept: ORTHOPEDICS | Facility: CLINIC | Age: 58
End: 2023-05-25
Payer: OTHER MISCELLANEOUS

## 2023-05-25 DIAGNOSIS — Z98.890 S/P LEFT KNEE ARTHROSCOPY: Primary | ICD-10-CM

## 2023-05-25 PROCEDURE — 99024 PR POST-OP FOLLOW-UP VISIT: ICD-10-PCS | Mod: ,,, | Performed by: NURSE PRACTITIONER

## 2023-05-25 PROCEDURE — 20610 LARGE JOINT ASPIRATION/INJECTION: L KNEE: ICD-10-PCS | Mod: S$PBB,79,LT, | Performed by: NURSE PRACTITIONER

## 2023-05-25 PROCEDURE — 99213 OFFICE O/P EST LOW 20 MIN: CPT | Mod: PBBFAC,25 | Performed by: NURSE PRACTITIONER

## 2023-05-25 PROCEDURE — 99024 POSTOP FOLLOW-UP VISIT: CPT | Mod: ,,, | Performed by: NURSE PRACTITIONER

## 2023-05-25 PROCEDURE — 20610 DRAIN/INJ JOINT/BURSA W/O US: CPT | Mod: PBBFAC | Performed by: NURSE PRACTITIONER

## 2023-05-25 RX ORDER — BUPIVACAINE HYDROCHLORIDE 2.5 MG/ML
1 INJECTION, SOLUTION EPIDURAL; INFILTRATION; INTRACAUDAL
Status: DISCONTINUED | OUTPATIENT
Start: 2023-05-25 | End: 2023-05-25 | Stop reason: HOSPADM

## 2023-05-25 RX ORDER — TRIAMCINOLONE ACETONIDE 40 MG/ML
40 INJECTION, SUSPENSION INTRA-ARTICULAR; INTRAMUSCULAR
Status: DISCONTINUED | OUTPATIENT
Start: 2023-05-25 | End: 2023-05-25 | Stop reason: HOSPADM

## 2023-05-25 RX ADMIN — TRIAMCINOLONE ACETONIDE 40 MG: 40 INJECTION, SUSPENSION INTRA-ARTICULAR; INTRAMUSCULAR at 10:05

## 2023-05-25 RX ADMIN — BUPIVACAINE HYDROCHLORIDE 1 ML: 2.5 INJECTION, SOLUTION EPIDURAL; INFILTRATION; INTRACAUDAL; PERINEURAL at 10:05

## 2023-05-25 NOTE — PATIENT INSTRUCTIONS
Safety guidelines activity restrictions cusp patient.  Verbalized understanding.  Given work excuse remain off work until follow-up with Dr. Fernandez.  He is offered arthrocentesis.  Wished proceed.  Left knee prepped with Betadine.  Arthrocentesis performed yielding 18 cc of xanthochromic fluid.  This is followed by installation of triamcinolone 40 mg with bupivacaine 0.25% 1 cc.  Continue knee wrap.  Return orthopedic clinic with Dr. Fernandez in 2 weeks or sooner as needed.

## 2023-05-25 NOTE — PROCEDURES
Large Joint Aspiration/Injection: L knee    Date/Time: 5/25/2023 10:45 AM  Performed by: MARIANO Lindsey  Authorized by: MARAINO Lindsey     Indications:  Arthritis and pain  Timeout: prior to procedure the correct patient, procedure, and site was verified      Local anesthesia used?: Yes    Local anesthetic:  Topical anesthetic    Details:  Needle Size:  18 G  Ultrasonic Guidance for needle placement?: No    Approach:  Lateral  Location:  Knee  Site:  L knee  Medications:  1 mL BUPivacaine (PF) 0.25% (2.5 mg/ml) 0.25 % (2.5 mg/mL); 40 mg triamcinolone acetonide 40 mg/mL  Aspirate:  Yellow  Patient tolerance:  Patient tolerated the procedure well with no immediate complications     Left knee prepped with Betadine

## 2023-05-25 NOTE — PROGRESS NOTES
57-year-old male patient presents ambulatory to orthopedic clinic re-evaluation left knee.  She is known to orthopedic having undergone left knee arthroscopy approximately 4 weeks ago.  He was noted during the procedure to have degenerative tearing of the posterior horn of the medial meniscus grade 2 to 3/4 DJD of the medial femoral condyle articular surface with M SP enlargement.  He states he continues have discomfort on the medial side of his knee.  He has been using a knee wrap on his knee.  States the wrap gives him some comfort.  He states he feels he is not ready to climb on his job at this time.  Denies fever, chills or any sign or symptom of infection.      PE:  He is noted be ambulating independently no perceptible limp.  Physical exam left knee skin is warm dry and intact.  No soft tissue swelling noted.  There is 2+ intra-articular effusion appreciated clinically.  Range of motion left knee 0° extension further flexion approximately 100°.  There was excellent ligamentous balance instability noted clinically.  He is offered arthrocentesis of his left knee wishes to proceed.      Impression:  1.) 4 weeks following knee arthroscopy-left knee 2.)  DJD left knee    Plan:  Safety guidelines activity restrictions cusp patient.  Verbalized understanding.  Given work excuse remain off work until follow-up with Dr. Fernandez.  He is offered arthrocentesis.  Wished proceed.  Left knee prepped with Betadine.  Arthrocentesis performed yielding 18 cc of xanthochromic fluid.  This is followed by installation of triamcinolone 40 mg with bupivacaine 0.25% 1 cc.  Continue knee wrap.  Return orthopedic clinic with Dr. Fernandez in 2 weeks or sooner as needed.

## 2023-05-25 NOTE — LETTER
May 25, 2023      [facility]  [address]  [facility] [address]  Phone: [phone]  Fax: [phone]       Patient: Joe Mcgrath   YOB: 1965  Date of Visit: 05/25/2023    To Whom It May Concern:    RANDELL Mcgrath  was at [facility] on 05/25/2023. The patient may return to work on upon next office visit with Dr. Rebecca EVANS at that visit. If you have any questions or concerns, or if I can be of further assistance, please do not hesitate to contact me.    Sincerely,    aPrk Sanabria, CMA

## 2023-06-05 ENCOUNTER — TELEPHONE (OUTPATIENT)
Dept: ORTHOPEDICS | Facility: CLINIC | Age: 58
End: 2023-06-05
Payer: OTHER MISCELLANEOUS

## 2023-06-14 ENCOUNTER — OFFICE VISIT (OUTPATIENT)
Dept: ORTHOPEDICS | Facility: CLINIC | Age: 58
End: 2023-06-14
Payer: OTHER MISCELLANEOUS

## 2023-06-14 DIAGNOSIS — Z98.890 S/P LEFT KNEE ARTHROSCOPY: Primary | ICD-10-CM

## 2023-06-14 PROCEDURE — 99024 POSTOP FOLLOW-UP VISIT: CPT | Mod: ,,, | Performed by: NURSE PRACTITIONER

## 2023-06-14 PROCEDURE — 99212 OFFICE O/P EST SF 10 MIN: CPT | Mod: PBBFAC | Performed by: NURSE PRACTITIONER

## 2023-06-14 PROCEDURE — 99024 PR POST-OP FOLLOW-UP VISIT: ICD-10-PCS | Mod: ,,, | Performed by: NURSE PRACTITIONER

## 2023-06-14 NOTE — PROGRESS NOTES
57-year-old male patient presents ambulatory to orthopedic clinic re-evaluation left knee.  She is known to orthopedic having undergone left knee arthroscopy approximately 6 weeks ago.  He was noted during the procedure to have degenerative tearing of the posterior horn of the medial meniscus grade 2 to 3/4 DJD of the medial femoral condyle articular surface with M SP enlargement.  He states he continues have discomfort on the medial side of his knee.  He has been using a knee wrap on his knee.  States the wrap gives him some comfort.  He states he feels he is not ready to climb on his job at this time.  Denies fever, chills or any sign or symptom of infection.      PE:  He is noted be ambulating independently no perceptible limp.  Physical exam left knee skin is warm dry and intact.  No soft tissue swelling noted.  No intra-articular effusion appreciated clinically.  Range of motion left knee 0° extension further flexion approximately 120°.  There was excellent ligamentous balance instability noted clinically.  He is offered arthrocentesis of his left knee wishes to proceed.      Impression:  1.) 6 weeks following knee arthroscopy-left knee 2.)  DJD left knee    Plan:  Safety guidelines activity restrictions cusp patient.  Verbalized understanding.  Given work excuse to return to work last 5th 2023 without restrictions.  Return orthopedic clinic on a as needed basis.

## 2023-06-14 NOTE — LETTER
June 14, 2023      [facility]  [address] [facility] [address]  Phone: [phone]  Fax: [phone]       Patient: Joe Mcgrath   YOB: 1965  Date of Visit: 06/14/2023    To Whom It May Concern:    RANDELL Mcgrath  was at [facility] on 06/14/2023. The patient may return to work/school on 7/5/23 with no restrictions. If you have any questions or concerns, or if I can be of further assistance, please do not hesitate to contact me.    Sincerely,  MARIANO Rodriguez/  Debby Burk MA

## 2023-06-14 NOTE — PATIENT INSTRUCTIONS
Safety guidelines activity restrictions cusp patient.  Verbalized understanding.  Given work excuse to return to work last 5th 2023 without restrictions.  Return orthopedic clinic on a as needed basis.

## 2023-06-29 ENCOUNTER — TELEPHONE (OUTPATIENT)
Dept: ORTHOPEDICS | Facility: CLINIC | Age: 58
End: 2023-06-29
Payer: OTHER MISCELLANEOUS

## 2023-06-29 NOTE — TELEPHONE ENCOUNTER
6/29/23 @ 1427 pt stopped by clinic and requested a work excuse to go back to work on 7/5/23 and to have just the return to work date and removed the with restriction or without restriction part of note. Work excuse given to return to work on 7/5/23.

## 2023-06-29 NOTE — LETTER
June 29, 2023      [facility]  [address] [facility] [address]  Phone: [phone]  Fax: [phone]       Patient: Joe Mcgrath   YOB: 1965  Date of Visit: 06/29/2023    To Whom It May Concern:    RANDELL Mcgrath  was at [facility] on 06/29/2023. The patient may return to work/school on 7/5/23.  If you have any questions or concerns, or if I can be of further assistance, please do not hesitate to contact me.    Sincerely,  Dr. Fernandez/BethanierN

## 2023-07-19 ENCOUNTER — OFFICE VISIT (OUTPATIENT)
Dept: ORTHOPEDICS | Facility: CLINIC | Age: 58
End: 2023-07-19
Payer: OTHER MISCELLANEOUS

## 2023-07-19 DIAGNOSIS — M17.12 PRIMARY OSTEOARTHRITIS OF LEFT KNEE: Primary | ICD-10-CM

## 2023-07-19 PROCEDURE — 99213 OFFICE O/P EST LOW 20 MIN: CPT | Mod: PBBFAC | Performed by: ORTHOPAEDIC SURGERY

## 2023-07-19 PROCEDURE — 99024 POSTOP FOLLOW-UP VISIT: CPT | Mod: S$PBB,,, | Performed by: ORTHOPAEDIC SURGERY

## 2023-07-19 PROCEDURE — 99024 PR POST-OP FOLLOW-UP VISIT: ICD-10-PCS | Mod: S$PBB,,, | Performed by: ORTHOPAEDIC SURGERY

## 2023-07-19 RX ORDER — MELOXICAM 15 MG/1
15 TABLET ORAL DAILY
Qty: 30 TABLET | Refills: 2 | Status: SHIPPED | OUTPATIENT
Start: 2023-07-19

## 2023-07-19 NOTE — PROGRESS NOTES
CLINIC NOTE       Chief Complaint   Patient presents with    Left Knee - Pain        Joe Mcgrath is a 57 y.o. male seen today for recheck of his left knee.  He underwent left knee arthroscopy 04/25/2023.  He was found to have a complex degenerative tear of the posterior horn of the medial meniscus and moderate DJD of the medial femoral condyle articular surface.  Is significantly improved at this time following arthroscopy but has some lingering medial knee pain as expected with the underlying DJD.  He has returned to work duties.  I have discussed with him future treatment options to include oral anti-inflammatory medications, corticosteroid injection or viscosupplementation injections in the future.  Ultimately he may at some point in the future require total knee replacement arthroplasty.  He understands the desire to forestall TKR for as long as possible because of his young age.  He is agreed to the prescription for Mobic 15 mg 1 p.o. q.d. p.r.n..  He will reappoint if he wants to proceed with corticosteroid injection or viscosupplementation injections.  He was advised that today will be his maximal medical improvement date.  Using the AMA guide to permanent impairment 6th edition partial medial meniscectomy would result in a 1% impairment of the left lower extremity and 1% impairment of the whole person.    Past Medical History:   Diagnosis Date    Asthma     Chronic back pain     HTN (hypertension)      Family History   Problem Relation Age of Onset    Hypertension Mother     Heart disease Mother     Diabetes Mother     Osteoarthritis Mother     Heart disease Father      Current Outpatient Medications on File Prior to Visit   Medication Sig Dispense Refill    amLODIPine (NORVASC) 10 MG tablet Take 10 mg by mouth once daily.      diclofenac sodium (VOLTAREN) 1 % Gel Apply 4 g topically 4 (four) times daily. 500 g 2    fluticasone furoate-vilanteroL (BREO ELLIPTA) 100-25 mcg/dose diskus inhaler 1  puff.      gabapentin (NEURONTIN) 300 MG capsule Take 600 mg by mouth once daily.      lisinopriL (PRINIVIL,ZESTRIL) 40 MG tablet Take 40 mg by mouth once daily.      metoprolol succinate (TOPROL-XL) 25 MG 24 hr tablet Take 25 mg by mouth.      spironolactone (ALDACTONE) 25 MG tablet 1 tablet.       No current facility-administered medications on file prior to visit.       ROS     There were no vitals filed for this visit.    Past Surgical History:   Procedure Laterality Date    ARTHROSCOPY OF KNEE Left 4/25/2023    Procedure: ARTHROSCOPY, KNEE;  Surgeon: William Fernandez MD;  Location: [facility] ORTHO OR;  Service: Orthopedics;  Laterality: Left;    BACK SURGERY      KNEE ARTHROSCOPY W/ MENISCECTOMY Left 4/25/2023    Procedure: ARTHROSCOPY, KNEE, WITH MENISCECTOMY;  Surgeon: William Fernandez MD;  Location: [facility] ORTHO OR;  Service: Orthopedics;  Laterality: Left;        Review of patient's allergies indicates:  No Known Allergies     Ortho Exam     Radiographic Examination:    Technique:    Findings:    Impression:   See Above    Assessment and Plan  Patient Active Problem List    Diagnosis Date Noted    S/P left knee arthroscopy 05/11/2023    Primary osteoarthritis of left knee 05/11/2023    Hyperglycemia 01/10/2023    Increased creatine kinase level 01/10/2023    Mild persistent asthma without complication 01/10/2023    Mixed hyperlipidemia 01/10/2023    Screening for malignant neoplasm of prostate 01/10/2023    Stage 2 chronic kidney disease 01/10/2023    Acute medial meniscus tear of left knee 11/09/2022    Dyspnea on exertion 10/12/2022    Essential hypertension 10/12/2022    Osteoarthritis 10/12/2022    Other long term (current) drug therapy 10/12/2022    Internal derangement of left knee 10/12/2022          William Fernandez M.D.

## 2023-09-15 ENCOUNTER — OFFICE VISIT (OUTPATIENT)
Dept: ORTHOPEDICS | Facility: CLINIC | Age: 58
End: 2023-09-15
Payer: OTHER MISCELLANEOUS

## 2023-09-15 DIAGNOSIS — M17.12 PRIMARY OSTEOARTHRITIS OF LEFT KNEE: Primary | ICD-10-CM

## 2023-09-15 DIAGNOSIS — M25.562 LEFT KNEE PAIN, UNSPECIFIED CHRONICITY: ICD-10-CM

## 2023-09-15 PROCEDURE — 99213 OFFICE O/P EST LOW 20 MIN: CPT | Mod: PBBFAC,25 | Performed by: ORTHOPAEDIC SURGERY

## 2023-09-15 PROCEDURE — 99214 OFFICE O/P EST MOD 30 MIN: CPT | Mod: S$PBB,25,, | Performed by: ORTHOPAEDIC SURGERY

## 2023-09-15 PROCEDURE — 99214 PR OFFICE/OUTPT VISIT, EST, LEVL IV, 30-39 MIN: ICD-10-PCS | Mod: S$PBB,25,, | Performed by: ORTHOPAEDIC SURGERY

## 2023-09-15 PROCEDURE — 20610 PR DRAIN/INJECT LARGE JOINT/BURSA: ICD-10-PCS | Mod: S$PBB,LT,, | Performed by: ORTHOPAEDIC SURGERY

## 2023-09-15 PROCEDURE — 20610 DRAIN/INJ JOINT/BURSA W/O US: CPT | Mod: S$PBB,LT,, | Performed by: ORTHOPAEDIC SURGERY

## 2023-09-15 PROCEDURE — 99999PBSHW PR PBB SHADOW TECHNICAL ONLY FILED TO HB: ICD-10-PCS | Mod: PBBFAC,,,

## 2023-09-15 PROCEDURE — 20610 DRAIN/INJ JOINT/BURSA W/O US: CPT | Mod: PBBFAC,LT | Performed by: ORTHOPAEDIC SURGERY

## 2023-09-15 PROCEDURE — 99999PBSHW PR PBB SHADOW TECHNICAL ONLY FILED TO HB: Mod: PBBFAC,,,

## 2023-09-15 RX ORDER — TRIAMCINOLONE ACETONIDE 40 MG/ML
40 INJECTION, SUSPENSION INTRA-ARTICULAR; INTRAMUSCULAR
Status: COMPLETED | OUTPATIENT
Start: 2023-09-15 | End: 2023-09-15

## 2023-09-15 RX ORDER — BUPIVACAINE HYDROCHLORIDE 2.5 MG/ML
1 INJECTION, SOLUTION INFILTRATION; PERINEURAL
Status: COMPLETED | OUTPATIENT
Start: 2023-09-15 | End: 2023-09-15

## 2023-09-15 RX ADMIN — BUPIVACAINE HYDROCHLORIDE 2.5 MG: 2.5 INJECTION, SOLUTION INFILTRATION; PERINEURAL at 12:09

## 2023-09-15 RX ADMIN — TRIAMCINOLONE ACETONIDE 40 MG: 40 INJECTION, SUSPENSION INTRA-ARTICULAR; INTRAMUSCULAR at 12:09

## 2023-09-15 NOTE — LETTER
September 15, 2023      [facility]  [address] [place] [address]  Phone: [phone]  Fax: [phone]       Patient: Joe Mcgrath   YOB: 1965  Date of Visit: 09/15/2023    To Whom It May Concern:    RANDELL Mcgrath  was at [facility] on 09/15/2023. The patient may return to work/school on 9/16/23 with no restrictions. If you have any questions or concerns, or if I can be of further assistance, please do not hesitate to contact me.    Sincerely,  Dr. Fernandez/  Medina Sol RN

## 2023-09-15 NOTE — PROGRESS NOTES
CLINIC NOTE       Chief Complaint   Patient presents with    Knee Pain        Joe Mcgrath is a 58 y.o. male seen today for recheck of his left knee.  He underwent left knee arthroscopy with partial medial meniscectomy and shaving of medial femoral condyle 04/25/2023.  He had underlying moderately severe DJD involving the articular cartilage of the medial compartment.  Been treated with Mobic oral anti-inflammatory medication.  Discussed the option for corticosteroid injection as well as possible viscosupplementation injections to treat the underlying DJD.  Ultimately he may be facing TKR but of course I would like to forestall that event his longest possible.      Past Medical History:   Diagnosis Date    Asthma     Chronic back pain     HTN (hypertension)      Family History   Problem Relation Age of Onset    Hypertension Mother     Heart disease Mother     Diabetes Mother     Osteoarthritis Mother     Heart disease Father      Current Outpatient Medications on File Prior to Visit   Medication Sig Dispense Refill    amLODIPine (NORVASC) 10 MG tablet Take 10 mg by mouth once daily.      diclofenac sodium (VOLTAREN) 1 % Gel Apply 4 g topically 4 (four) times daily. 500 g 2    fluticasone furoate-vilanteroL (BREO ELLIPTA) 100-25 mcg/dose diskus inhaler 1 puff.      gabapentin (NEURONTIN) 300 MG capsule Take 600 mg by mouth once daily.      lisinopriL (PRINIVIL,ZESTRIL) 40 MG tablet Take 40 mg by mouth once daily.      meloxicam (MOBIC) 15 MG tablet Take 1 tablet (15 mg total) by mouth once daily. 30 tablet 2    metoprolol succinate (TOPROL-XL) 25 MG 24 hr tablet Take 25 mg by mouth.      spironolactone (ALDACTONE) 25 MG tablet 1 tablet.       No current facility-administered medications on file prior to visit.       ROS     There were no vitals filed for this visit.    Past Surgical History:   Procedure Laterality Date    ARTHROSCOPY OF KNEE Left 4/25/2023    Procedure: ARTHROSCOPY, KNEE;  Surgeon: William ZHANG  MD Rebecca;  Location: [facility] ORTHO OR;  Service: Orthopedics;  Laterality: Left;    BACK SURGERY      KNEE ARTHROSCOPY W/ MENISCECTOMY Left 4/25/2023    Procedure: ARTHROSCOPY, KNEE, WITH MENISCECTOMY;  Surgeon: William Fernandez MD;  Location: [facility] ORTHO OR;  Service: Orthopedics;  Laterality: Left;        Review of patient's allergies indicates:  No Known Allergies     Ortho Exam left knee normal contour no significant effusion.    Radiographic Examination:    Technique:    Findings:    Impression:   See Above    Assessment and Plan  Patient Active Problem List    Diagnosis Date Noted    S/P left knee arthroscopy 05/11/2023    Primary osteoarthritis of left knee 05/11/2023    Hyperglycemia 01/10/2023    Increased creatine kinase level 01/10/2023    Mild persistent asthma without complication 01/10/2023    Mixed hyperlipidemia 01/10/2023    Screening for malignant neoplasm of prostate 01/10/2023    Stage 2 chronic kidney disease 01/10/2023    Acute medial meniscus tear of left knee 11/09/2022    Dyspnea on exertion 10/12/2022    Essential hypertension 10/12/2022    Osteoarthritis 10/12/2022    Other long term (current) drug therapy 10/12/2022    Internal derangement of left knee 10/12/2022    Impression:  Moderately severe DJD medial compartment left knee   Plan:  Left knee was prepped with Betadine intra-articular steroid injection performed with triamcinolone and Marcaine 1 cc each.  Literature given regarding viscosupplementation injections.      William Fernandez M.D.

## 2023-09-19 NOTE — ANESTHESIA PREPROCEDURE EVALUATION
04/25/2023  Joe Mcgrath is a 57 y.o., male.      Pre-op Assessment    I have reviewed the Patient Summary Reports.    I have reviewed the NPO Status.   I have reviewed the Medications.     Review of Systems         Anesthesia Plan  Type of Anesthesia, risks & benefits discussed:    Anesthesia Type: Gen ETT  Intra-op Monitoring Plan: Standard ASA Monitors  Post Op Pain Control Plan: IV/PO Opioids PRN  Induction:  IV  Informed Consent: Informed consent signed with the Patient and all parties understand the risks and agree with anesthesia plan.  All questions answered.   ASA Score: 2    Ready For Surgery From Anesthesia Perspective.     .  NPO greater than 8 hours  NAC  NKDA    Asthma  HTN  H/o back issues  CKD    Airway exam deferred (COVID precautions); adequate ROM at neck.       Acne Type: A milial cyst Detail Level: Detailed Consent was obtained and risks were reviewed including but not limited to scarring, infection, bleeding, scabbing, incomplete removal, and allergy to anesthesia. Render Post-Care Instructions In Note?: no Prep Text (Optional): Prior to removal the treatment areas were prepped in the usual fashion. Post-Care Instructions: I reviewed with the patient in detail post-care instructions. Patient is to keep the treatment areas dry overnight, and then apply bacitracin twice daily until healed. Patient may apply hydrogen peroxide soaks to remove any crusting. Extraction Method: 11 blade and comedo extractor

## 2023-10-30 ENCOUNTER — OFFICE VISIT (OUTPATIENT)
Dept: ORTHOPEDICS | Facility: CLINIC | Age: 58
End: 2023-10-30
Payer: OTHER MISCELLANEOUS

## 2023-10-30 DIAGNOSIS — M25.562 LEFT KNEE PAIN, UNSPECIFIED CHRONICITY: ICD-10-CM

## 2023-10-30 DIAGNOSIS — M17.12 PRIMARY OSTEOARTHRITIS OF LEFT KNEE: Primary | ICD-10-CM

## 2023-10-30 PROCEDURE — 99214 PR OFFICE/OUTPT VISIT, EST, LEVL IV, 30-39 MIN: ICD-10-PCS | Mod: S$PBB,,, | Performed by: ORTHOPAEDIC SURGERY

## 2023-10-30 PROCEDURE — 99214 OFFICE O/P EST MOD 30 MIN: CPT | Mod: S$PBB,,, | Performed by: ORTHOPAEDIC SURGERY

## 2023-10-30 PROCEDURE — 99213 OFFICE O/P EST LOW 20 MIN: CPT | Mod: PBBFAC | Performed by: ORTHOPAEDIC SURGERY

## 2023-10-30 NOTE — PROGRESS NOTES
CLINIC NOTE       Chief Complaint   Patient presents with    Left Knee - Pain        Joe Mcgrath is a 58 y.o. male seen today for recheck of his left knee.  He has significant DJD involving the medial compartment.  He underwent intra-articular steroid injection 09/15/2023 but it derived only 1-2 weeks improvement.  Discussed option for viscosupplementation as well as total knee replacement arthroplasty.  He would like to proceed with viscosupplementation injections.  He will need precertification through worker's comp.  We will call him to returned for 1st injection of the series when approved.      Past Medical History:   Diagnosis Date    Asthma     Chronic back pain     HTN (hypertension)      Family History   Problem Relation Age of Onset    Hypertension Mother     Heart disease Mother     Diabetes Mother     Osteoarthritis Mother     Heart disease Father      Current Outpatient Medications on File Prior to Visit   Medication Sig Dispense Refill    amLODIPine (NORVASC) 10 MG tablet Take 10 mg by mouth once daily.      diclofenac sodium (VOLTAREN) 1 % Gel Apply 4 g topically 4 (four) times daily. 500 g 2    fluticasone furoate-vilanteroL (BREO ELLIPTA) 100-25 mcg/dose diskus inhaler 1 puff.      gabapentin (NEURONTIN) 300 MG capsule Take 600 mg by mouth once daily.      lisinopriL (PRINIVIL,ZESTRIL) 40 MG tablet Take 40 mg by mouth once daily.      meloxicam (MOBIC) 15 MG tablet Take 1 tablet (15 mg total) by mouth once daily. 30 tablet 2    metoprolol succinate (TOPROL-XL) 25 MG 24 hr tablet Take 25 mg by mouth.      spironolactone (ALDACTONE) 25 MG tablet 1 tablet.       No current facility-administered medications on file prior to visit.       ROS     There were no vitals filed for this visit.    Past Surgical History:   Procedure Laterality Date    ARTHROSCOPY OF KNEE Left 4/25/2023    Procedure: ARTHROSCOPY, KNEE;  Surgeon: William Fernandez MD;  Location: [facility];  Service:  Orthopedics;  Laterality: Left;    BACK SURGERY      KNEE ARTHROSCOPY W/ MENISCECTOMY Left 4/25/2023    Procedure: ARTHROSCOPY, KNEE, WITH MENISCECTOMY;  Surgeon: William Fernandez MD;  Location: [facility];  Service: Orthopedics;  Laterality: Left;        Review of patient's allergies indicates:  No Known Allergies     Ortho Exam     Radiographic Examination:    Technique:    Findings:    Impression:   See Above    Assessment and Plan  Patient Active Problem List    Diagnosis Date Noted    S/P left knee arthroscopy 05/11/2023    Primary osteoarthritis of left knee 05/11/2023    Hyperglycemia 01/10/2023    Increased creatine kinase level 01/10/2023    Mild persistent asthma without complication 01/10/2023    Mixed hyperlipidemia 01/10/2023    Screening for malignant neoplasm of prostate 01/10/2023    Stage 2 chronic kidney disease 01/10/2023    Acute medial meniscus tear of left knee 11/09/2022    Dyspnea on exertion 10/12/2022    Essential hypertension 10/12/2022    Osteoarthritis 10/12/2022    Other long term (current) drug therapy 10/12/2022    Internal derangement of left knee 10/12/2022          William Fernandez M.D.

## 2023-11-22 ENCOUNTER — OFFICE VISIT (OUTPATIENT)
Dept: ORTHOPEDICS | Facility: CLINIC | Age: 58
End: 2023-11-22
Payer: OTHER MISCELLANEOUS

## 2023-11-22 DIAGNOSIS — M17.12 PRIMARY OSTEOARTHRITIS OF LEFT KNEE: Primary | ICD-10-CM

## 2023-11-22 PROCEDURE — 20610 DRAIN/INJ JOINT/BURSA W/O US: CPT | Mod: PBBFAC,LT | Performed by: ORTHOPAEDIC SURGERY

## 2023-11-22 PROCEDURE — 99499 UNLISTED E&M SERVICE: CPT | Mod: ,,, | Performed by: ORTHOPAEDIC SURGERY

## 2023-11-22 PROCEDURE — 20610 DRAIN/INJ JOINT/BURSA W/O US: CPT | Mod: S$PBB,LT,, | Performed by: ORTHOPAEDIC SURGERY

## 2023-11-22 PROCEDURE — 99999PBSHW PR PBB SHADOW TECHNICAL ONLY FILED TO HB: Mod: JZ,PBBFAC,,

## 2023-11-22 PROCEDURE — 99499 NO LOS: ICD-10-PCS | Mod: ,,, | Performed by: ORTHOPAEDIC SURGERY

## 2023-11-22 PROCEDURE — 99213 OFFICE O/P EST LOW 20 MIN: CPT | Mod: PBBFAC,25 | Performed by: ORTHOPAEDIC SURGERY

## 2023-11-22 PROCEDURE — 20610 PR DRAIN/INJECT LARGE JOINT/BURSA: ICD-10-PCS | Mod: S$PBB,LT,, | Performed by: ORTHOPAEDIC SURGERY

## 2023-11-22 PROCEDURE — 99999PBSHW PR PBB SHADOW TECHNICAL ONLY FILED TO HB: ICD-10-PCS | Mod: JZ,PBBFAC,,

## 2023-11-22 RX ADMIN — Medication 30 MG: at 01:11

## 2023-11-22 NOTE — PROGRESS NOTES
CLINIC NOTE       Chief Complaint   Patient presents with    Left Knee - Injections        Joe Mcgrath is a 58 y.o. male seen today for his 1st Orthovisc injection left knee for primary localized DJD.      Past Medical History:   Diagnosis Date    Asthma     Chronic back pain     HTN (hypertension)      Family History   Problem Relation Age of Onset    Hypertension Mother     Heart disease Mother     Diabetes Mother     Osteoarthritis Mother     Heart disease Father      Current Outpatient Medications on File Prior to Visit   Medication Sig Dispense Refill    amLODIPine (NORVASC) 10 MG tablet Take 10 mg by mouth once daily.      diclofenac sodium (VOLTAREN) 1 % Gel Apply 4 g topically 4 (four) times daily. 500 g 2    fluticasone furoate-vilanteroL (BREO ELLIPTA) 100-25 mcg/dose diskus inhaler 1 puff.      gabapentin (NEURONTIN) 300 MG capsule Take 600 mg by mouth once daily.      lisinopriL (PRINIVIL,ZESTRIL) 40 MG tablet Take 40 mg by mouth once daily.      meloxicam (MOBIC) 15 MG tablet Take 1 tablet (15 mg total) by mouth once daily. 30 tablet 2    metoprolol succinate (TOPROL-XL) 25 MG 24 hr tablet Take 25 mg by mouth.      spironolactone (ALDACTONE) 25 MG tablet 1 tablet.       No current facility-administered medications on file prior to visit.       ROS     There were no vitals filed for this visit.    Past Surgical History:   Procedure Laterality Date    ARTHROSCOPY OF KNEE Left 4/25/2023    Procedure: ARTHROSCOPY, KNEE;  Surgeon: William Fernandez MD;  Location: [facility] OR;  Service: Orthopedics;  Laterality: Left;    BACK SURGERY      KNEE ARTHROSCOPY W/ MENISCECTOMY Left 4/25/2023    Procedure: ARTHROSCOPY, KNEE, WITH MENISCECTOMY;  Surgeon: William Fernandez MD;  Location: [facility] OR;  Service: Orthopedics;  Laterality: Left;        Review of patient's allergies indicates:  No Known Allergies     Ortho Exam left knee mild 1+ intra-articular effusion    Radiographic  Examination:    Technique:    Findings:    Impression:   See Above    Assessment and Plan  Patient Active Problem List    Diagnosis Date Noted    S/P left knee arthroscopy 05/11/2023    Primary osteoarthritis of left knee 05/11/2023    Hyperglycemia 01/10/2023    Increased creatine kinase level 01/10/2023    Mild persistent asthma without complication 01/10/2023    Mixed hyperlipidemia 01/10/2023    Screening for malignant neoplasm of prostate 01/10/2023    Stage 2 chronic kidney disease 01/10/2023    Acute medial meniscus tear of left knee 11/09/2022    Dyspnea on exertion 10/12/2022    Essential hypertension 10/12/2022    Osteoarthritis 10/12/2022    Other long term (current) drug therapy 10/12/2022    Internal derangement of left knee 10/12/2022    Impression primary localized DJD-left knee  Plan: Left knee was prepped with Betadine intra-articular injection performed with Orthovisc 2.5 cc without difficulty.  He is to return in 7-10 days time for 2nd injection of the series or sooner for interval problems.      William Fernandez M.D.

## 2023-12-01 ENCOUNTER — OFFICE VISIT (OUTPATIENT)
Dept: ORTHOPEDICS | Facility: CLINIC | Age: 58
End: 2023-12-01
Payer: OTHER MISCELLANEOUS

## 2023-12-01 VITALS
OXYGEN SATURATION: 97 % | WEIGHT: 211 LBS | HEIGHT: 71 IN | BODY MASS INDEX: 29.54 KG/M2 | HEART RATE: 72 BPM | RESPIRATION RATE: 16 BRPM

## 2023-12-01 DIAGNOSIS — M17.12 PRIMARY OSTEOARTHRITIS OF LEFT KNEE: Primary | ICD-10-CM

## 2023-12-01 PROCEDURE — 20610 LARGE JOINT ASPIRATION/INJECTION: L KNEE: ICD-10-PCS | Mod: S$PBB,LT,, | Performed by: NURSE PRACTITIONER

## 2023-12-01 PROCEDURE — 20610 DRAIN/INJ JOINT/BURSA W/O US: CPT | Mod: PBBFAC,LT | Performed by: NURSE PRACTITIONER

## 2023-12-01 PROCEDURE — 99999PBSHW PR PBB SHADOW TECHNICAL ONLY FILED TO HB: ICD-10-PCS | Mod: JZ,PBBFAC,,

## 2023-12-01 PROCEDURE — 99499 NO LOS: ICD-10-PCS | Mod: S$PBB,,, | Performed by: NURSE PRACTITIONER

## 2023-12-01 PROCEDURE — 99499 UNLISTED E&M SERVICE: CPT | Mod: S$PBB,,, | Performed by: NURSE PRACTITIONER

## 2023-12-01 PROCEDURE — 99214 OFFICE O/P EST MOD 30 MIN: CPT | Mod: PBBFAC | Performed by: NURSE PRACTITIONER

## 2023-12-01 PROCEDURE — 99999PBSHW PR PBB SHADOW TECHNICAL ONLY FILED TO HB: Mod: JZ,PBBFAC,,

## 2023-12-01 RX ADMIN — Medication 30 MG: at 11:12

## 2023-12-01 NOTE — PATIENT INSTRUCTIONS
Safety guidelines and activity restrictions are discussed with the patient.  Verbalized understanding.  He was offered arthrocentesis.  Arthrocentesis performed followed by Orthovisc 2 cc instilled without difficulty.  Return orthopedic clinic in 7-10 days with me for 3rd series or sooner Dr. Fernandez as needed.

## 2023-12-01 NOTE — PROCEDURES
Large Joint Aspiration/Injection: L knee    Date/Time: 12/1/2023 11:30 AM    Performed by: Serafin Jose FNP  Authorized by: Serafin Jose FNP    Consent Done?:  Yes (Verbal)  Indications:  Arthritis  Site marked: the procedure site was marked      Local anesthesia used?: Yes      Details:  Needle Size:  18 G  Ultrasonic Guidance for needle placement?: No    Approach:  Lateral  Location:  Knee  Site:  L knee  Medications:  30 mg sodium hyaluronate (orthovisc) 30 mg/2 mL  Aspirate amount (mL):  35  Patient tolerance:  Patient tolerated the procedure well with no immediate complications     Left knee prepped with Betadine.

## 2023-12-01 NOTE — PROGRESS NOTES
58-year-old male patient presents today for 2nd series of viscosupplementation of his left knee.  States he feels he may have gotten some relief after the 1st injection.  Denies fever, chills or any sinus symptom flexion.      PE:  Physical exam left knee skin is warm dry and intact.  There is 2+ intra-articular effusion appreciated clinically.  No obvious sign or symptom of infection.      Impression:  1.)  Primary localized DJD knee-left 2.) knee effusion-left    Plan:  Safety guidelines and activity restrictions are discussed with the patient.  Verbalized understanding.  He was offered arthrocentesis.  Arthrocentesis performed followed by Orthovisc 2 cc instilled without difficulty.  Return orthopedic clinic in 7-10 days with me for 3rd series or sooner Dr. Fernandez as needed.

## 2023-12-06 ENCOUNTER — OFFICE VISIT (OUTPATIENT)
Dept: ORTHOPEDICS | Facility: CLINIC | Age: 58
End: 2023-12-06
Payer: OTHER MISCELLANEOUS

## 2023-12-06 VITALS — BODY MASS INDEX: 29.54 KG/M2 | WEIGHT: 211 LBS | HEIGHT: 71 IN

## 2023-12-06 DIAGNOSIS — M17.12 PRIMARY OSTEOARTHRITIS OF LEFT KNEE: Primary | ICD-10-CM

## 2023-12-06 PROCEDURE — 99499 NO LOS: ICD-10-PCS | Mod: S$PBB,,, | Performed by: NURSE PRACTITIONER

## 2023-12-06 PROCEDURE — 99999PBSHW PR PBB SHADOW TECHNICAL ONLY FILED TO HB: Mod: JZ,PBBFAC,,

## 2023-12-06 PROCEDURE — 99999PBSHW PR PBB SHADOW TECHNICAL ONLY FILED TO HB: ICD-10-PCS | Mod: JZ,PBBFAC,,

## 2023-12-06 PROCEDURE — 99213 OFFICE O/P EST LOW 20 MIN: CPT | Mod: PBBFAC | Performed by: NURSE PRACTITIONER

## 2023-12-06 PROCEDURE — 20610 DRAIN/INJ JOINT/BURSA W/O US: CPT | Mod: PBBFAC,LT | Performed by: NURSE PRACTITIONER

## 2023-12-06 PROCEDURE — 99499 UNLISTED E&M SERVICE: CPT | Mod: S$PBB,,, | Performed by: NURSE PRACTITIONER

## 2023-12-06 PROCEDURE — 20610 LARGE JOINT ASPIRATION/INJECTION: L KNEE: ICD-10-PCS | Mod: S$PBB,LT,, | Performed by: NURSE PRACTITIONER

## 2023-12-06 RX ADMIN — Medication 30 MG: at 11:12

## 2023-12-06 NOTE — PROGRESS NOTES
58-year-old male patient presents today for 3rd series of Orthovisc injections of left knee.  He states he may have gotten some relief after the 1st 2.  He was aware can be as much as 6 weeks after 3rd for realizes full benefit.      PE: He is noted be ambulating independent no perceptible limp.  Exam of the left knee shows no soft tissue swelling.  No sign or symptom of infection.  Minimal intra-articular effusion appreciated clinically.      Impression:  Primary localized DJD knee-left    Plan:  Safety guidelines activity restrictions discussed with the patient.  Verbalized understanding.  Left knee prepped with Betadine.  Intra-articular Orthovisc 2 cc instilled without difficulty.  Return orthopedic clinic on a as needed basis.

## 2023-12-06 NOTE — PATIENT INSTRUCTIONS
Safety guidelines activity restrictions discussed with the patient.  Verbalized understanding.  Left knee prepped with Betadine.  Intra-articular Orthovisc 2 cc instilled without difficulty.  Return orthopedic clinic on a as needed basis.

## 2023-12-06 NOTE — PROCEDURES
Large Joint Aspiration/Injection: L knee    Date/Time: 12/6/2023 11:45 AM    Performed by: Serafin Jose FNP  Authorized by: Serafin Jose FNP    Consent Done?:  Yes (Verbal)  Indications:  Arthritis  Site marked: the procedure site was marked    Timeout: prior to procedure the correct patient, procedure, and site was verified      Local anesthesia used?: Yes    Local anesthetic:  Topical anesthetic    Details:  Needle Size:  21 G  Ultrasonic Guidance for needle placement?: No    Approach:  Anterolateral  Location:  Knee  Site:  L knee  Medications:  30 mg sodium hyaluronate (orthovisc) 30 mg/2 mL  Patient tolerance:  Patient tolerated the procedure well with no immediate complications     Left knee prepped with Betadine

## 2024-02-06 ENCOUNTER — OFFICE VISIT (OUTPATIENT)
Dept: ORTHOPEDICS | Facility: CLINIC | Age: 59
End: 2024-02-06
Payer: OTHER MISCELLANEOUS

## 2024-02-06 VITALS — WEIGHT: 211 LBS | BODY MASS INDEX: 29.54 KG/M2 | HEIGHT: 71 IN

## 2024-02-06 DIAGNOSIS — M17.12 PRIMARY OSTEOARTHRITIS OF LEFT KNEE: Primary | ICD-10-CM

## 2024-02-06 PROCEDURE — 99214 OFFICE O/P EST MOD 30 MIN: CPT | Mod: PBBFAC | Performed by: NURSE PRACTITIONER

## 2024-02-06 PROCEDURE — 99213 OFFICE O/P EST LOW 20 MIN: CPT | Mod: S$PBB,,, | Performed by: NURSE PRACTITIONER

## 2024-02-06 NOTE — PATIENT INSTRUCTIONS
Safety guidelines activity restrictions discussed with patient.  Verbalized understanding.  We will set her up physical therapy for range motion strengthening exercises 1 to 3 times a week x4 weeks.  Follow-up Dr. Fernandez in 4 weeks or sooner as needed.

## 2024-02-06 NOTE — PROGRESS NOTES
58-year-old male patient presents ambulatory orthopedic clinic valuation of his left knee.  He was known to orthopedic clinic having undergone left knee arthroscopy in March of 2023.  He was also had the benefit of viscosupplementation with the last course completed on 12/06/2023.  He states he was continue has some stiffness and tightness knee.  He was requesting consideration physical therapy.      PE:  Physical exam he is noted be ambulating independent no perceptible limp.  Physical exam left knee skin is warm dry and intact.  1+ intra-articular effusion appreciated clinically.  Range motion 0° extension further flexion proximally 110°.    Impression:  DJD knee     Plan:  Safety guidelines activity restrictions discussed with patient.  Verbalized understanding.  We will set her up physical therapy for range motion strengthening exercises 1 to 3 times a week x4 weeks.  Follow-up Dr. Fernandez in 4 weeks or sooner as needed.

## 2024-07-23 DIAGNOSIS — M25.562 LEFT KNEE PAIN, UNSPECIFIED CHRONICITY: Primary | ICD-10-CM

## 2024-07-24 ENCOUNTER — HOSPITAL ENCOUNTER (OUTPATIENT)
Dept: RADIOLOGY | Facility: HOSPITAL | Age: 59
Discharge: HOME | End: 2024-07-24
Attending: ORTHOPAEDIC SURGERY
Payer: OTHER MISCELLANEOUS

## 2024-07-24 ENCOUNTER — OFFICE VISIT (OUTPATIENT)
Dept: ORTHOPEDICS | Facility: CLINIC | Age: 59
End: 2024-07-24
Payer: OTHER MISCELLANEOUS

## 2024-07-24 DIAGNOSIS — M17.12 PRIMARY OSTEOARTHRITIS OF LEFT KNEE: Primary | ICD-10-CM

## 2024-07-24 DIAGNOSIS — M25.562 LEFT KNEE PAIN, UNSPECIFIED CHRONICITY: ICD-10-CM

## 2024-07-24 PROCEDURE — 73562 X-RAY EXAM OF KNEE 3: CPT | Mod: 26,LT,, | Performed by: ORTHOPAEDIC SURGERY

## 2024-07-24 PROCEDURE — 99213 OFFICE O/P EST LOW 20 MIN: CPT | Mod: PBBFAC,25 | Performed by: ORTHOPAEDIC SURGERY

## 2024-07-24 PROCEDURE — 63600175 PHARM REV CODE 636 W HCPCS

## 2024-07-24 PROCEDURE — 99999 PR PBB SHADOW E&M-EST. PATIENT-LVL III: CPT | Mod: PBBFAC,,, | Performed by: ORTHOPAEDIC SURGERY

## 2024-07-24 PROCEDURE — 20610 DRAIN/INJ JOINT/BURSA W/O US: CPT | Mod: PBBFAC | Performed by: ORTHOPAEDIC SURGERY

## 2024-07-24 PROCEDURE — 73562 X-RAY EXAM OF KNEE 3: CPT | Mod: TC,LT

## 2024-07-24 NOTE — PROGRESS NOTES
CLINIC NOTE       Chief Complaint   Patient presents with    Left Knee - Pain        Joe Mcgrath is a 58 y.o. male seen today for recheck of his left knee.  He underwent knee arthroscopy 04/25/2023.  He had degenerative tear of the medial meniscus.  There was however significant medial compartment arthritis.  He was been trying to forestall total knee replacement arthroplasty.  He was he was the benefit of oral anti-inflammatory medications, corticosteroid injection and viscosupplementation injections in the past.  He was been undergoing physical therapy in his done well in terms of restoration of motion.  He however is dissatisfied with the repetitive therapy modalities the do not appear to be beneficial for him.  He would like to try a different facility we are more modalities may be available.  He will be referred to [facility].  He was also recently having some right knee pain.  He likely has osteoarthritic changes on that side.  I do not have an x-ray for my review at this time.  He does have Mobic available for p.r.n. use      Past Medical History:   Diagnosis Date    Asthma     Chronic back pain     HTN (hypertension)      Family History   Problem Relation Name Age of Onset    Hypertension Mother      Heart disease Mother      Diabetes Mother      Osteoarthritis Mother      Heart disease Father       Current Outpatient Medications on File Prior to Visit   Medication Sig Dispense Refill    amLODIPine (NORVASC) 10 MG tablet Take 10 mg by mouth once daily.      diclofenac sodium (VOLTAREN) 1 % Gel Apply 4 g topically 4 (four) times daily. 500 g 2    fluticasone furoate-vilanteroL (BREO ELLIPTA) 100-25 mcg/dose diskus inhaler 1 puff.      gabapentin (NEURONTIN) 300 MG capsule Take 600 mg by mouth once daily.      lisinopriL (PRINIVIL,ZESTRIL) 40 MG tablet Take 40 mg by mouth once daily.      meloxicam (MOBIC) 15 MG tablet Take 1 tablet (15 mg total) by mouth once daily. 30 tablet 2    metoprolol succinate  (TOPROL-XL) 25 MG 24 hr tablet Take 25 mg by mouth.      spironolactone (ALDACTONE) 25 MG tablet 1 tablet.       No current facility-administered medications on file prior to visit.       ROS     There were no vitals filed for this visit.    Past Surgical History:   Procedure Laterality Date    ARTHROSCOPY OF KNEE Left 4/25/2023    Procedure: ARTHROSCOPY, KNEE;  Surgeon: William Fernandez MD;  [facility];  Service: Orthopedics;  Laterality: Left;    BACK SURGERY      KNEE ARTHROSCOPY W/ MENISCECTOMY Left 4/25/2023    Procedure: ARTHROSCOPY, KNEE, WITH MENISCECTOMY;  Surgeon: William Fernandez MD;  [facility];  Service: Orthopedics;  Laterality: Left;        Review of patient's allergies indicates:  No Known Allergies     Ortho Exam :  Left knee arthroscopy portal incisions are well healed without signs of infection.  There is moderate effusion.  Knee motion is 0-130 degrees of flexion.  Knee ligaments stable clinically.  There is tenderness palpation of the medial joint line.  Right knee exam shows 1+ intra-articular effusion and stable ligaments.    Radiographic Examination:  Left knee 07/24/2024    Technique:  Three views AP lateral and patellar    Findings:  Bones well mineralized.  There is severe narrowing of the medial joint space with mild genu varum.    Impression:   See Above    Assessment and Plan  Patient Active Problem List    Diagnosis Date Noted    S/P left knee arthroscopy 05/11/2023    Primary osteoarthritis of left knee 05/11/2023    Hyperglycemia 01/10/2023    Increased creatine kinase level 01/10/2023    Mild persistent asthma without complication 01/10/2023    Mixed hyperlipidemia 01/10/2023    Screening for malignant neoplasm of prostate 01/10/2023    Stage 2 chronic kidney disease 01/10/2023    Acute medial meniscus tear of left knee 11/09/2022    Dyspnea on exertion 10/12/2022    Essential hypertension 10/12/2022    Osteoarthritis 10/12/2022    Other  long term (current) drug therapy 10/12/2022    Internal derangement of left knee 10/12/2022    Impression:  Medial compartment DJD-left knee.  She was SPECT early DJD right knee  Plan:  Again discussed the option for total knee replacement arthroplasty.  Procedure was shown in detail using models and actual components.  He was not ready to consider total knee replacement arthroplasty at this time.  He was requested steroid injection in both knees today.  Left knee was prepped with Betadine intra-articular steroid injection performed with triamcinolone and Marcaine 1 cc each.  The right knee was then prepped with Betadine intra-articular steroid injection performed with triamcinolone and Marcaine 1 cc each.  We will refer him to [facility] physical therapy.  Recheck as needed.    William Fernandez M.D.

## 2024-07-24 NOTE — LETTER
July 24, 2024      [facility]  [address] [facility] [address]  Phone: [phone]  Fax: [phone]       Patient: Joe Mcgrath   YOB: 1965  Date of Visit: 07/24/2024    To Whom It May Concern:    RANDELL Mcgrath  was at [facility] on 07/24/2024. The patient may return to work/school on 07/24/2024 with no restrictions. If you have any questions or concerns, or if I can be of further assistance, please do not hesitate to contact me.    Sincerely,  Dr.Pomierski Debby Burk MA

## 2024-08-13 ENCOUNTER — CLINICAL SUPPORT (OUTPATIENT)
Dept: REHABILITATION | Facility: HOSPITAL | Age: 59
End: 2024-08-13
Payer: OTHER MISCELLANEOUS

## 2024-08-13 DIAGNOSIS — Z98.890 S/P LEFT KNEE ARTHROSCOPY: ICD-10-CM

## 2024-08-13 DIAGNOSIS — R26.9 GAIT DISTURBANCE: ICD-10-CM

## 2024-08-13 DIAGNOSIS — M17.12 PRIMARY OSTEOARTHRITIS OF LEFT KNEE: Primary | ICD-10-CM

## 2024-08-13 DIAGNOSIS — M62.81 WEAKNESS OF LEFT QUADRICEPS MUSCLE: ICD-10-CM

## 2024-08-13 DIAGNOSIS — M25.662 DECREASED ROM OF LEFT KNEE: ICD-10-CM

## 2024-08-13 PROCEDURE — 97110 THERAPEUTIC EXERCISES: CPT

## 2024-08-13 PROCEDURE — 97162 PT EVAL MOD COMPLEX 30 MIN: CPT

## 2024-08-13 NOTE — PLAN OF CARE
[facility]   Physical Therapy Initial Evaluation      Name: Ligia Mcgrath  [facility] Number: [id number]    Therapy Diagnosis:   Encounter Diagnosis   Name Primary?    Primary osteoarthritis of left knee         Physician: William Fernandez MD    Physician Orders: PT Eval and Treat   Medical Diagnosis from Referral: see above  Evaluation Date: 8/13/2024  Authorization Period Expiration: 7/24/2025  Plan of Care Expiration: 10/11/2024    Date of Surgery: 4/25/2023  Visit # / Visits authorized: 1/12   FOTO: 1/ 3    Precautions: Standard     Time In: 11:42 am  Time Out: 12:19 pm  Total Billable Time: 37 minutes    Subjective     Date of onset: 4/25/2023    History of current condition - LIGIA reports: was cutting a trench out to lay a water line - went to turn around and was in some chunks of asphalt that had been dug out and knee twisted and he felt a sharp pain - ended up with medial menisectomy - patient was also noted to have grade 2-3/4 DJD involving the weight-bearing articular surface of the medial femoral condyle per the operative report - he is continuing to have pain and stiffness in the left knee - has physical therapy a couple of times since surgery - MD note stated he is trying to put off total knee replacement     Falls: n/a    Imaging: xrays  Findings:  Bones well mineralized.  There is severe narrowing of the medial joint space with mild genu varum.     Prior Therapy: after surgery last year - then finished a bout at a different facility 2-3 months ago  Social History:  lives with their family  Occupation: drives trucks for the [employer]  Prior Level of Function: independent   Current Level of Function: independent but has pain    Pain:  Current 5/10, worst 8/10, best 3/10   Location: left knee  - constant  Description: Aching, Throbbing, Sharp, and Variable  Aggravating Factors: Standing, Walking, Getting out of bed/chair, and climbing in and out of his truck -  stiff first thing in the morning  Easing Factors: hot bath and rest    Patients goals: trying to avoid knee replacement - decrease pain     Medical History:   Past Medical History:   Diagnosis Date    Asthma     Chronic back pain     HTN (hypertension)        Surgical History:   Ligia Mcgrath  has a past surgical history that includes Back surgery; Arthroscopy of knee (Left, 4/25/2023); and Knee arthroscopy w/ meniscectomy (Left, 4/25/2023).    Medications:   Ligia has a current medication list which includes the following prescription(s): amlodipine, diclofenac sodium, fluticasone furoate-vilanterol, gabapentin, lisinopril, meloxicam, metoprolol succinate, and spironolactone.    Allergies:   Review of patient's allergies indicates:  No Known Allergies     Objective        Range of motion:  Motion Right Left    Knee extension   0 degrees    -4 degrees    Knee flexion   125 degrees    116 degrees        Manual muscle test   Muscle Right  Left    Knee extension  MMT strength: 5/5  MMT strength: 3+/5   Knee flexion  MMT strength: 5/5  MMT strength: 4/5       Gait:  Weight bearing precautions: FWB  Assistive device: none  Ambulation distance and deviations: antalgic on left  Stairs: 4 steps at home - goes up/down one step at a time  Comments:      Clinical Special Tests:    Knee  H/o menisectomy left knee last year - severe medial compartment osteoarthritis   Resting at -4 degrees extension   -3 degrees w/ quad set   Maintains -3 w/ straight leg raise     Intake Outcome Measure for FOTO Knee Survey    Therapist reviewed FOTO scores for Ligia Mcgrath on 8/13/2024.   FOTO report - see Media section or FOTO account episode details.    Intake Score: 29         Treatment     Total Treatment time (time-based codes) separate from Evaluation: 10 minutes     LIGIA received the treatments listed below:      Quad set w/ towel roll under heel, straight leg raise w/ focus on no lag, heel slides w/ 10 second holds, prone  quad stretch w/ 20 second holds    Patient Education and Home Exercises     Education provided:   - evaluation results, plan of care and home exercise program     Written Home Exercises Provided: yes. Exercises were reviewed and LIGIA was able to demonstrate them prior to the end of the session.  LIGIA demonstrated good  understanding of the education provided. See EMR under Patient Instructions for exercises provided during therapy sessions.    Assessment     Ligia is a 58 y.o. male referred to outpatient Physical Therapy with a medical diagnosis of left knee pain. Patient presents with complaints of left knee pain and stiffness since surgery last year. He still lacks full extension and decreased quad strength. His left knee flexion is about 9 degrees less than his right. He does have significant loss of joint space in the medial compartment per radiology report. He has two different bouts of physical therapy but has not been pleased with his outcomes. Will work to restore full range of motion, improve quad strength and control, and decrease pain so patient can function at home and work without pain.     Patient prognosis is Good.   Patient will benefit from skilled outpatient Physical Therapy to address the deficits stated above and in the chart below, provide patient /family education, and to maximize patientt's level of independence.     Plan of care discussed with patient: Yes  Patient's spiritual, cultural and educational needs considered and patient is agreeable to the plan of care and goals as stated below:     Anticipated Barriers for therapy: significant osteoarthritis medial compartment left knee    Medical Necessity is demonstrated by the following  History  Co-morbidities and personal factors that may impact the plan of care [] LOW: no personal factors / co-morbidities  [x] MODERATE: 1-2 personal factors / co-morbidities  [] HIGH: 3+ personal factors / co-morbidities    Moderate / High Support  "Documentation:   Co-morbidities affecting plan of care:   Past Medical History:   Diagnosis Date    Asthma     Chronic back pain     HTN (hypertension)    Left knee scope 4/25/23  Left knee osteoarthritis medial compartment    Personal Factors:   no deficits     Examination  Body Structures and Functions, activity limitations and participation restrictions that may impact the plan of care [] LOW: addressing 1-2 elements  [x] MODERATE: 3+ elements  [] HIGH: 4+ elements (please support below)    Moderate / High Support Documentation: decreased range of motion, pain, quad weakness, gait disturbance, osteoarthritis      Clinical Presentation [] LOW: stable  [x] MODERATE: Evolving  [] HIGH: Unstable     Decision Making/ Complexity Score: moderate         SHORT TERM GOALS  1.  Patient to be independent with home exercise program to facilitate carryover between therapy visits.  2.  Patient will have 0-125 degrees range of motion left knee for improved functional mobility.  3.  Patient will perform straight leg raise and hold 10 seconds without quad lag for increased quad control.  4.  Patient will increase manual muscle test of left lower extremity to 5/5 for increased stability with gait and activiites of daily living.    LONG TERM GOALS  1.  Patient will be able to perform a lateral and front step down from 6" height to be able to go up/down stairs reciprocal pattern without handrails and good eccentric control on left lower extremity.  2.  Patient will ambulate independent on even and uneven surfaces without deviation and without pain in left knee.  3.  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     Plan     Plan of care Certification: 8/13/2024 to 10/11/2024.    Outpatient Physical Therapy 2 times weekly for 8 weeks to include the following interventions: Electrical Stimulation NMES, Gait Training, Manual Therapy, Moist Heat/ Ice, Neuromuscular Re-ed, Patient Education, " Therapeutic Activities, and Therapeutic Exercise.     MONIE JONAS, PT        Physician's Signature: _________________________________________ Date: ________________

## 2024-08-15 PROBLEM — M62.81 WEAKNESS OF LEFT QUADRICEPS MUSCLE: Status: ACTIVE | Noted: 2024-08-15

## 2024-08-15 PROBLEM — R26.9 GAIT DISTURBANCE: Status: ACTIVE | Noted: 2024-08-15

## 2024-08-15 PROBLEM — M25.662 DECREASED ROM OF LEFT KNEE: Status: ACTIVE | Noted: 2024-08-15

## 2024-08-20 ENCOUNTER — CLINICAL SUPPORT (OUTPATIENT)
Dept: REHABILITATION | Facility: HOSPITAL | Age: 59
End: 2024-08-20
Payer: OTHER MISCELLANEOUS

## 2024-08-20 DIAGNOSIS — M17.12 PRIMARY OSTEOARTHRITIS OF LEFT KNEE: ICD-10-CM

## 2024-08-20 DIAGNOSIS — M25.662 DECREASED ROM OF LEFT KNEE: ICD-10-CM

## 2024-08-20 DIAGNOSIS — Z98.890 S/P LEFT KNEE ARTHROSCOPY: Primary | ICD-10-CM

## 2024-08-20 DIAGNOSIS — M62.81 WEAKNESS OF LEFT QUADRICEPS MUSCLE: ICD-10-CM

## 2024-08-20 DIAGNOSIS — R26.9 GAIT DISTURBANCE: ICD-10-CM

## 2024-08-20 PROCEDURE — 97110 THERAPEUTIC EXERCISES: CPT | Mod: CQ

## 2024-08-20 PROCEDURE — 97112 NEUROMUSCULAR REEDUCATION: CPT | Mod: CQ

## 2024-08-20 NOTE — PROGRESS NOTES
[facility]   Physical Therapy Treatment Note      Name: Ligia Mcgrath  Clinic Number: [id number]    Therapy Diagnosis:   Encounter Diagnoses   Name Primary?    S/P left knee arthroscopy Yes    Primary osteoarthritis of left knee     Decreased ROM of left knee     Weakness of left quadriceps muscle     Gait disturbance      Physician: William Fernandez MD    Visit Date: 8/20/2024    Physician Orders: PT Eval and Treat   Medical Diagnosis from Referral: see above  Evaluation Date: 8/13/2024  Authorization Period Expiration: 7/24/2025  Plan of Care Expiration: 10/11/2024     Date of Surgery: 4/25/2023  Visit # / Visits authorized: 2/12   FOTO: 1/ 3     Precautions: Standard    PTA Visit #: 1/5     Time In: 10:46 am  Time Out: 11:43 am  Total Billable Time: 48 billable minutes (9 min non billed)    Subjective     Pt reports: medial knee pain with work.  He was compliant with home exercise program.  Response to previous treatment: no complaints   Functional change: no change noted    Pain: 3/10  Location: left knee      Objective      Range of motion:  Motion Right Left    Knee extension   0 degrees    -4 degrees    Knee flexion   125 degrees    116 degrees         Treatment     LIGIA received the treatments listed below:      therapeutic exercises to develop strength, endurance, ROM, flexibility, posture, and core stabilization for 25 minutes including:  Prone Quad set --10 x 10 sec  Straight leg raise w/ focus on no lag--4 x 5 x 5 sec hold  Prone quad stretch w/ 20 second holds--3 reps  QS flexion rolls--3 x 10  Slant board--2 min  Seated ext--no weight--3 x 10   bike x 5 min    manual therapy techniques: Joint mobilizations, Manual traction, Myofacial release, Soft tissue Mobilization, and Friction Massage were applied to the:   L knee for 0 minutes, including:  -    neuromuscular re-education activities to improve: Balance, Coordination, Kinesthetic Sense, Proprioception, and  Posture for 23 minutes. The following activities were included:  Slant board with QS and shift--10 x 10 sec  Leg Press--6 plates--3 x 10  Single Leg Press--3 plates--2 x 10    therapeutic activities to improve functional performance for   0 minutes, including:  -    Patient Education and Home Exercises       Education provided:   - review of home exercise program and current Plan of Care/rationale of treatment.    Written Home Exercises Provided: Patient instructed to cont prior HEP. Exercises were reviewed and LIGIA was able to demonstrate them prior to the end of the session.  LIGIA demonstrated good understanding of the education provided. See EMR under Patient Instructions for exercises provided during therapy sessions    Assessment     At Evaluation:  Ligia is a 58 y.o. male referred to outpatient Physical Therapy with a medical diagnosis of left knee pain. Patient presents with complaints of left knee pain and stiffness since surgery last year. He still lacks full extension and decreased quad strength. His left knee flexion is about 9 degrees less than his right. He does have significant loss of joint space in the medial compartment per radiology report. He has two different bouts of physical therapy but has not been pleased with his outcomes. Will work to restore full range of motion, improve quad strength and control, and decrease pain so patient can function at home and work without pain.     Current Assessment:  Arrived for first visit after eval.  Reports compliance with initial HEP.  Reports medial knee pain... especially with work.  Good ROM and quad control... still  little shaky with SLR.  Focused on ROM and Quad strength... will advance as tolerated.  Cont POC.    LIGIA Is progressing towards his goals.   Pt prognosis is Good.     Pt will continue to benefit from skilled outpatient physical therapy to address the deficits listed in the problem list box on initial evaluation, provide pt/family  "education and to maximize pt's level of independence in the home and community environment.     Pt's spiritual, cultural and educational needs considered and pt agreeable to plan of care and goals.     Anticipated barriers to physical therapy: none    Goals:   SHORT TERM GOALS  1.  Patient to be independent with home exercise program to facilitate carryover between therapy visits.  2.  Patient will have 0-125 degrees range of motion left knee for improved functional mobility.  3.  Patient will perform straight leg raise and hold 10 seconds without quad lag for increased quad control.  4.  Patient will increase manual muscle test of left lower extremity to 5/5 for increased stability with gait and activiites of daily living.     LONG TERM GOALS  1.  Patient will be able to perform a lateral and front step down from 6" height to be able to go up/down stairs reciprocal pattern without handrails and good eccentric control on left lower extremity.  2.  Patient will ambulate independent on even and uneven surfaces without deviation and without pain in left knee.  3.  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     Plan     Plan of care Certification: 8/13/2024 to 10/11/2024.     Outpatient Physical Therapy 2 times weekly for 8 weeks to include the following interventions: Electrical Stimulation NMES, Gait Training, Manual Therapy, Moist Heat/ Ice, Neuromuscular Re-ed, Patient Education, Therapeutic Activities, and Therapeutic Exercise.     Ciro Bass, PTA   08/20/2024    "

## 2024-08-27 ENCOUNTER — CLINICAL SUPPORT (OUTPATIENT)
Dept: REHABILITATION | Facility: HOSPITAL | Age: 59
End: 2024-08-27
Payer: OTHER MISCELLANEOUS

## 2024-08-27 DIAGNOSIS — M62.81 WEAKNESS OF LEFT QUADRICEPS MUSCLE: ICD-10-CM

## 2024-08-27 DIAGNOSIS — M25.662 DECREASED ROM OF LEFT KNEE: ICD-10-CM

## 2024-08-27 DIAGNOSIS — M17.12 PRIMARY OSTEOARTHRITIS OF LEFT KNEE: ICD-10-CM

## 2024-08-27 DIAGNOSIS — Z98.890 S/P LEFT KNEE ARTHROSCOPY: Primary | ICD-10-CM

## 2024-08-27 DIAGNOSIS — R26.9 GAIT DISTURBANCE: ICD-10-CM

## 2024-08-27 PROCEDURE — 97112 NEUROMUSCULAR REEDUCATION: CPT | Mod: CQ

## 2024-08-27 PROCEDURE — 97110 THERAPEUTIC EXERCISES: CPT | Mod: CQ

## 2024-08-27 NOTE — PROGRESS NOTES
[facility]   Physical Therapy Treatment Note      Name: Ligia Mcgrath  Clinic Number: [id number]    Therapy Diagnosis:   Encounter Diagnoses   Name Primary?    S/P left knee arthroscopy Yes    Primary osteoarthritis of left knee     Decreased ROM of left knee     Weakness of left quadriceps muscle     Gait disturbance      Physician: William Fernandez MD    Visit Date: 8/27/2024    Physician Orders: PT Eval and Treat   Medical Diagnosis from Referral: see above  Evaluation Date: 8/13/2024  Authorization Period Expiration: 7/24/2025  Plan of Care Expiration: 10/11/2024     Date of Surgery: 4/25/2023  Visit # / Visits authorized: 2/11  FOTO: 1/ 3     Precautions: Standard    PTA Visit #: 2/5     Time In: 11:33 am  Time Out: 12: 15 pm  Total Billable Time:  42 minutes     Case conference with Kacey of pt's POC today.    Subjective     Pt reports: Pt reports continued medial left knee pain   He was compliant with home exercise program.  Response to previous treatment: no complaints   Functional change: none     Pain: 6-7/10  Location: left knee      Objective      Range of motion:  Motion Right Left    Knee extension   0 degrees    -4 degrees    Knee flexion   125 degrees    116 degrees         Treatment     LIGIA received the treatments listed below:      therapeutic exercises to develop strength, endurance, ROM, flexibility, posture, and core stabilization for 25 minutes including:  Prone Quad set --10 x 10 sec  Straight leg raise w/ focus on no lag--4 x 5 x 5 s/h   Prone quad stretch gr strap 5 x 10 s/h   Hamstring rolls wit swiss ball--2 x 10  Slant board--2 min  Seated Long Arc Quad --no weight--3 x 10  Nustep/Bike x 5 min    manual therapy techniques: Joint mobilizations, Manual traction, Myofacial release, Soft tissue Mobilization, and Friction Massage were applied to the:   L knee for 0 minutes, including:      neuromuscular re-education activities to improve: Balance,  Coordination, Kinesthetic Sense, Proprioception, and Posture for 17 minutes. The following activities were included:  Slant board with QS and shift--10 x 10 sec  Leg Press--6 plates--3 x 10  Single Leg Press--3 plates--2 x 10    therapeutic activities to improve functional performance for  0 minutes, including:      Patient Education and Home Exercises       Education provided:   - review of home exercise program and current Plan of Care/rationale of treatment.    Written Home Exercises Provided: Patient instructed to cont prior HEP. Exercises were reviewed and LIGIA was able to demonstrate them prior to the end of the session.  LIGIA demonstrated good understanding of the education provided. See EMR under Patient Instructions for exercises provided during therapy sessions    Assessment     At Evaluation:  Ligia is a 58 y.o. male referred to outpatient Physical Therapy with a medical diagnosis of left knee pain. Patient presents with complaints of left knee pain and stiffness since surgery last year. He still lacks full extension and decreased quad strength. His left knee flexion is about 9 degrees less than his right. He does have significant loss of joint space in the medial compartment per radiology report. He has two different bouts of physical therapy but has not been pleased with his outcomes. Will work to restore full range of motion, improve quad strength and control, and decrease pain so patient can function at home and work without pain.     Current Assessment:  Pt arrived with 6-7/10 left knee medial knee pain. Reports he doesn't think it'll ever go away. Tolerated all exercises above fairly well.  Reports compliance with initial HEP. Good ROM and quad control.Continued to focus on ROM and Quad strength.will advance as tolerated.  Cont POC.    LIGIA Is progressing towards his goals.   Pt prognosis is Good.     Pt will continue to benefit from skilled outpatient physical therapy to address the  "deficits listed in the problem list box on initial evaluation, provide pt/family education and to maximize pt's level of independence in the home and community environment.     Pt's spiritual, cultural and educational needs considered and pt agreeable to plan of care and goals.     Anticipated barriers to physical therapy: none    Goals:   SHORT TERM GOALS  1.  Patient to be independent with home exercise program to facilitate carryover between therapy visits.  2.  Patient will have 0-125 degrees range of motion left knee for improved functional mobility.  3.  Patient will perform straight leg raise and hold 10 seconds without quad lag for increased quad control.  4.  Patient will increase manual muscle test of left lower extremity to 5/5 for increased stability with gait and activiites of daily living.     LONG TERM GOALS  1.  Patient will be able to perform a lateral and front step down from 6" height to be able to go up/down stairs reciprocal pattern without handrails and good eccentric control on left lower extremity.  2.  Patient will ambulate independent on even and uneven surfaces without deviation and without pain in left knee.  3.  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     Plan     Plan of care Certification: 8/13/2024 to 10/11/2024.     Outpatient Physical Therapy 2 times weekly for 8 weeks to include the following interventions: Electrical Stimulation NMES, Gait Training, Manual Therapy, Moist Heat/Ice, Neuromuscular Re-ed, Patient Education, Therapeutic Activities, and Therapeutic Exercise.     Gladys Wong, PTA   08/27/2024      "

## 2024-09-04 ENCOUNTER — CLINICAL SUPPORT (OUTPATIENT)
Dept: REHABILITATION | Facility: HOSPITAL | Age: 59
End: 2024-09-04
Payer: OTHER MISCELLANEOUS

## 2024-09-04 DIAGNOSIS — R26.9 GAIT DISTURBANCE: ICD-10-CM

## 2024-09-04 DIAGNOSIS — M17.12 PRIMARY OSTEOARTHRITIS OF LEFT KNEE: ICD-10-CM

## 2024-09-04 DIAGNOSIS — M25.662 DECREASED ROM OF LEFT KNEE: ICD-10-CM

## 2024-09-04 DIAGNOSIS — M62.81 WEAKNESS OF LEFT QUADRICEPS MUSCLE: ICD-10-CM

## 2024-09-04 DIAGNOSIS — Z98.890 S/P LEFT KNEE ARTHROSCOPY: Primary | ICD-10-CM

## 2024-09-04 PROCEDURE — 97112 NEUROMUSCULAR REEDUCATION: CPT | Mod: CQ

## 2024-09-04 PROCEDURE — 97110 THERAPEUTIC EXERCISES: CPT | Mod: CQ

## 2024-09-04 NOTE — PROGRESS NOTES
[facility]   Physical Therapy Treatment Note      Name: Ligia Mcgrath  Clinic Number: [id number]    Therapy Diagnosis:   Encounter Diagnoses   Name Primary?    S/P left knee arthroscopy Yes    Primary osteoarthritis of left knee     Decreased ROM of left knee     Weakness of left quadriceps muscle     Gait disturbance      Physician: William Fernandez MD    Visit Date: 9/4/2024    Physician Orders: PT Eval and Treat   Medical Diagnosis from Referral: see above  Evaluation Date: 8/13/2024  Authorization Period Expiration: 7/24/2025  Plan of Care Expiration: 10/11/2024     Date of Surgery: 4/25/2023  Visit # / Visits authorized: 4/12  FOTO: 1/ 3 = 29     Precautions: Standard    PTA Visit #: 3/5     Time In: 11:33 am  Time Out: 12: 15 pm  Total Billable Time:  42 minutes     Case conference with Kacey of pt's POC today.    Subjective     Pt reports: Pt reports continued medial left knee pain   He was compliant with home exercise program.  Response to previous treatment: no complaints   Functional change: none     Pain: 6-7/10  Location: left knee      Objective      Range of motion:  Motion Right Left    Knee extension   0 degrees    -4 degrees    Knee flexion   125 degrees    116 degrees         Treatment     LIGIA received the treatments listed below:      therapeutic exercises to develop strength, endurance, ROM, flexibility, posture, and core stabilization for 25 minutes including:  Prone Quad set --10 x 10 sec  Straight leg raise w/ focus on no lag--4 x 5 x 5 s/h   Prone quad stretch gr strap 5 x 10 s/h   Hamstring rolls wit swiss ball--2 x 10  Slant board--2 min  Seated Long Arc Quad --no weight--3 x 10  Nustep/Bike x 5 min    manual therapy techniques: Joint mobilizations, Manual traction, Myofacial release, Soft tissue Mobilization, and Friction Massage were applied to the:   L knee for 0 minutes, including:      neuromuscular re-education activities to improve:  Balance, Coordination, Kinesthetic Sense, Proprioception, and Posture for 17 minutes. The following activities were included:  Slant board with QS and shift--10 x 10 sec  Leg Press--6 plates--3 x 10  Single Leg Press--3 plates--2 x 10    therapeutic activities to improve functional performance for  0 minutes, including:      Patient Education and Home Exercises       Education provided:   - review of home exercise program and current Plan of Care/rationale of treatment.    Written Home Exercises Provided: Patient instructed to cont prior HEP. Exercises were reviewed and LIGIA was able to demonstrate them prior to the end of the session.  LIGIA demonstrated good understanding of the education provided. See EMR under Patient Instructions for exercises provided during therapy sessions    Assessment     At Evaluation:  Ligia is a 58 y.o. male referred to outpatient Physical Therapy with a medical diagnosis of left knee pain. Patient presents with complaints of left knee pain and stiffness since surgery last year. He still lacks full extension and decreased quad strength. His left knee flexion is about 9 degrees less than his right. He does have significant loss of joint space in the medial compartment per radiology report. He has two different bouts of physical therapy but has not been pleased with his outcomes. Will work to restore full range of motion, improve quad strength and control, and decrease pain so patient can function at home and work without pain.     Current Assessment:  Pt arrived with 6-7/10 left knee medial knee pain. Reports he doesn't think it'll ever go away. Tolerated all exercises above fairly well.  Reports compliance with initial HEP. Good ROM and quad control.Continued to focus on ROM and Quad strength.will advance as tolerated.  Cont POC.    LIGIA Is progressing towards his goals.   Pt prognosis is Good.     Pt will continue to benefit from skilled outpatient physical therapy to address  "the deficits listed in the problem list box on initial evaluation, provide pt/family education and to maximize pt's level of independence in the home and community environment.     Pt's spiritual, cultural and educational needs considered and pt agreeable to plan of care and goals.     Anticipated barriers to physical therapy: none    Goals:   SHORT TERM GOALS  1.  Patient to be independent with home exercise program to facilitate carryover between therapy visits.  2.  Patient will have 0-125 degrees range of motion left knee for improved functional mobility.  3.  Patient will perform straight leg raise and hold 10 seconds without quad lag for increased quad control.  4.  Patient will increase manual muscle test of left lower extremity to 5/5 for increased stability with gait and activiites of daily living.     LONG TERM GOALS  1.  Patient will be able to perform a lateral and front step down from 6" height to be able to go up/down stairs reciprocal pattern without handrails and good eccentric control on left lower extremity.  2.  Patient will ambulate independent on even and uneven surfaces without deviation and without pain in left knee.  3.  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     Plan     Plan of care Certification: 8/13/2024 to 10/11/2024.     Outpatient Physical Therapy 2 times weekly for 8 weeks to include the following interventions: Electrical Stimulation NMES, Gait Training, Manual Therapy, Moist Heat/Ice, Neuromuscular Re-ed, Patient Education, Therapeutic Activities, and Therapeutic Exercise.     Petra Junior, PTA   09/04/2024        "

## 2024-09-04 NOTE — PROGRESS NOTES
[facility]   Physical Therapy Treatment Note      Name: Ligia Mcgrath  Clinic Number: [id number]    Therapy Diagnosis:   Encounter Diagnoses   Name Primary?    S/P left knee arthroscopy Yes    Primary osteoarthritis of left knee     Decreased ROM of left knee     Weakness of left quadriceps muscle     Gait disturbance      Physician: William Fernandez MD    Visit Date: 9/4/2024    Physician Orders: PT Eval and Treat   Medical Diagnosis from Referral: see above  Evaluation Date: 8/13/2024  Authorization Period Expiration: 7/24/2025  Plan of Care Expiration: 10/11/2024     Date of Surgery: 4/25/2023  Visit # / Visits authorized: 4/11  FOTO: 1/ 3     Precautions: Standard    PTA Visit #: 4/5     Time In: 11:33 am  Time Out: 12:27 pm  Total Billable Time:  46 billable minutes (8 min non billed)    Subjective     Pt reports: Pt reports continued medial left knee pain with WB  He was compliant with home exercise program.  Response to previous treatment: no complaints   Functional change: none     Pain: 8/10  Location: left knee      Objective      Range of motion:  Motion Right Left    Knee extension   0 degrees     0 degrees    Knee flexion   125 degrees    116 degrees         Treatment     LIGIA received the treatments listed below:      therapeutic exercises to develop strength, endurance, ROM, flexibility, posture, and core stabilization for 23 billable minutes including:  Prone Quad set --10 x 10 sec  Straight leg raise w/ focus on no lag--4 x 5 x 5 s/h   Prone quad stretch gr strap 5 x 10 s/h   Slant board--2 min  Seated Long Arc Quad --no weight--3 x 10  Nustep/Bike x 5 min  SAQ--5#--3 x 10    (Not Today)  Hamstring rolls wit swiss ball--2 x 10    manual therapy techniques: Joint mobilizations, Manual traction, Myofacial release, Soft tissue Mobilization, and Friction Massage were applied to the:   L knee for 0 minutes, including:      neuromuscular re-education activities to  "improve: Balance, Coordination, Kinesthetic Sense, Proprioception, and Posture for 23 billable minutes. The following activities were included:  Slant board with QS and shift--10 x 10 sec  Leg Press--6 plates--3 x 10  Single Leg Press--3 plates--2 x 10  Lateral eccentric lowering--2"--4 x 5    therapeutic activities to improve functional performance for  0 minutes, including:      Patient Education and Home Exercises       Education provided:   - review of home exercise program and current Plan of Care/rationale of treatment.    Written Home Exercises Provided: Patient instructed to cont prior HEP. Exercises were reviewed and LIGIA was able to demonstrate them prior to the end of the session.  LIGIA demonstrated good understanding of the education provided. See EMR under Patient Instructions for exercises provided during therapy sessions    Assessment     At Evaluation:  Ligia is a 58 y.o. male referred to outpatient Physical Therapy with a medical diagnosis of left knee pain. Patient presents with complaints of left knee pain and stiffness since surgery last year. He still lacks full extension and decreased quad strength. His left knee flexion is about 9 degrees less than his right. He does have significant loss of joint space in the medial compartment per radiology report. He has two different bouts of physical therapy but has not been pleased with his outcomes. Will work to restore full range of motion, improve quad strength and control, and decrease pain so patient can function at home and work without pain.     Current Assessment:  Pt arrived with 8/10 left knee medial knee pain. Reports he doesn't think it'll ever go away. Increased pain with stepping on and off truck.  Tolerated all exercises above fairly well.  Good extension today... still shaky with SLR.  Reports compliance with initial HEP. Good ROM and quad control.Continued to focus on ROM and Quad strength.will advance as tolerated.  Discussed " "need to come more than once a week to get stronger.  Added eccentric lateral step downs... pain with 4"... did 2".  Cont POC.    LIGIA Is progressing towards his goals.   Pt prognosis is Good.     Pt will continue to benefit from skilled outpatient physical therapy to address the deficits listed in the problem list box on initial evaluation, provide pt/family education and to maximize pt's level of independence in the home and community environment.     Pt's spiritual, cultural and educational needs considered and pt agreeable to plan of care and goals.     Anticipated barriers to physical therapy: none    Goals:   SHORT TERM GOALS  1.  Patient to be independent with home exercise program to facilitate carryover between therapy visits.  2.  Patient will have 0-125 degrees range of motion left knee for improved functional mobility.  3.  Patient will perform straight leg raise and hold 10 seconds without quad lag for increased quad control.  4.  Patient will increase manual muscle test of left lower extremity to 5/5 for increased stability with gait and activiites of daily living.     LONG TERM GOALS  1.  Patient will be able to perform a lateral and front step down from 6" height to be able to go up/down stairs reciprocal pattern without handrails and good eccentric control on left lower extremity.  2.  Patient will ambulate independent on even and uneven surfaces without deviation and without pain in left knee.  3.  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     Plan     Plan of care Certification: 8/13/2024 to 10/11/2024.     Outpatient Physical Therapy 2 times weekly for 8 weeks to include the following interventions: Electrical Stimulation NMES, Gait Training, Manual Therapy, Moist Heat/Ice, Neuromuscular Re-ed, Patient Education, Therapeutic Activities, and Therapeutic Exercise.     Ciro Bass, PTA   09/04/2024        "

## 2024-09-10 ENCOUNTER — CLINICAL SUPPORT (OUTPATIENT)
Dept: REHABILITATION | Facility: HOSPITAL | Age: 59
End: 2024-09-10
Payer: OTHER MISCELLANEOUS

## 2024-09-10 DIAGNOSIS — M17.12 PRIMARY OSTEOARTHRITIS OF LEFT KNEE: ICD-10-CM

## 2024-09-10 DIAGNOSIS — Z98.890 S/P LEFT KNEE ARTHROSCOPY: Primary | ICD-10-CM

## 2024-09-10 DIAGNOSIS — R26.9 GAIT DISTURBANCE: ICD-10-CM

## 2024-09-10 DIAGNOSIS — M62.81 WEAKNESS OF LEFT QUADRICEPS MUSCLE: ICD-10-CM

## 2024-09-10 DIAGNOSIS — M25.662 DECREASED ROM OF LEFT KNEE: ICD-10-CM

## 2024-09-10 PROCEDURE — 97110 THERAPEUTIC EXERCISES: CPT

## 2024-09-10 PROCEDURE — 97035 APP MDLTY 1+ULTRASOUND EA 15: CPT

## 2024-09-10 NOTE — PROGRESS NOTES
[facility]   Physical Therapy Treatment Note      Name: Ligia Mcgrath  Clinic Number: [id number]    Therapy Diagnosis:   Encounter Diagnoses   Name Primary?    S/P left knee arthroscopy Yes    Primary osteoarthritis of left knee     Decreased ROM of left knee     Weakness of left quadriceps muscle     Gait disturbance      Physician: William Fernandez MD    Visit Date: 9/10/2024    Physician Orders: PT Eval and Treat   Medical Diagnosis from Referral: see above  Evaluation Date: 8/13/2024  Authorization Period Expiration: 7/24/2025  Plan of Care Expiration: 10/11/2024     Date of Surgery: 4/25/2023  Visit # / Visits authorized: 5/11  FOTO: 1/ 3   2/3 = 31     Precautions: Standard    PTA Visit #: 0/5     Time In: 11:21 am  Time Out: 12:00 pm  Total Billable Time: 23 billable minutes ( 16 min non billed)    Subjective     Pt reports: Pt reports continued medial left knee pain. He reports 7/8-10  He was compliant with home exercise program.  Response to previous treatment: no complaints   Functional change: none     Pain: 8/10  Location: left knee      Objective      Range of motion:  Motion Right Left    Knee extension   0 degrees     0 degrees    Knee flexion   125 degrees    116 degrees         Treatment     LIGIA received the treatments listed below:      therapeutic exercises to develop strength, endurance, ROM, flexibility, posture, and core stabilization for 15 billable minutes including:  Quad set --10 x 10 sec  Prone quad set 5 x 10 sec  Straight leg raise w/ focus on no lag--4 x 5 x 5 s/h   Prone quad stretch gr strap 3 x 30 s/h   Nustep/Bike x 5 min  (Not today)  Slant board--2 min  Seated Long Arc Quad --no weight--3 x 10  SAQ--5#--3 x 10    (Not Today)  Hamstring rolls wit swiss ball--2 x 10    manual therapy techniques: Joint mobilizations, Manual traction, Myofacial release, Soft tissue Mobilization, and Friction Massage were applied to the:   L knee for 0  "minutes, including:      neuromuscular re-education activities to improve: Balance, Coordination, Kinesthetic Sense, Proprioception, and Posture for  billable minutes. The following activities were included:  (Not today)  Slant board with QS and shift--10 x 10 sec  Leg Press--6 plates--3 x 10  Single Leg Press--3 plates--2 x 10  Lateral eccentric lowering--2"--4 x 5    therapeutic activities to improve functional performance for  0 minutes, including:    Direct contact modalities: ultrasound 8 min on medial aspect of left knee 50%, 1MHz, 1.2 W/cm2      Patient Education and Home Exercises       Education provided:   - review of home exercise program and current Plan of Care/rationale of treatment.    Written Home Exercises Provided: Patient instructed to cont prior HEP. Exercises were reviewed and LIGIA was able to demonstrate them prior to the end of the session.  LIGIA demonstrated good understanding of the education provided. See EMR under Patient Instructions for exercises provided during therapy sessions    Assessment     At Evaluation:  Ligia is a 58 y.o. male referred to outpatient Physical Therapy with a medical diagnosis of left knee pain. Patient presents with complaints of left knee pain and stiffness since surgery last year. He still lacks full extension and decreased quad strength. His left knee flexion is about 9 degrees less than his right. He does have significant loss of joint space in the medial compartment per radiology report. He has two different bouts of physical therapy but has not been pleased with his outcomes. Will work to restore full range of motion, improve quad strength and control, and decrease pain so patient can function at home and work without pain.     Current Assessment:  Pt arrived with 7-8/10 left knee medial knee pain. Increased pain with stepping on and off truck.  Tolerated all exercises above fairly well. Reports compliance with initial HEP. Good ROM and quad " "control.Continued to focus on ROM and Quad strength will advance as tolerated. Added ultrasound today to stimulate blood flow to affected area. Cont POC.    LIGIA Is progressing towards his goals.   Pt prognosis is Good.     Pt will continue to benefit from skilled outpatient physical therapy to address the deficits listed in the problem list box on initial evaluation, provide pt/family education and to maximize pt's level of independence in the home and community environment.     Pt's spiritual, cultural and educational needs considered and pt agreeable to plan of care and goals.     Anticipated barriers to physical therapy: none    Goals:   SHORT TERM GOALS  1.  Patient to be independent with home exercise program to facilitate carryover between therapy visits.  2.  Patient will have 0-125 degrees range of motion left knee for improved functional mobility.  3.  Patient will perform straight leg raise and hold 10 seconds without quad lag for increased quad control.  4.  Patient will increase manual muscle test of left lower extremity to 5/5 for increased stability with gait and activiites of daily living.     LONG TERM GOALS  1.  Patient will be able to perform a lateral and front step down from 6" height to be able to go up/down stairs reciprocal pattern without handrails and good eccentric control on left lower extremity.  2.  Patient will ambulate independent on even and uneven surfaces without deviation and without pain in left knee.  3.  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     Plan     Plan of care Certification: 8/13/2024 to 10/11/2024.     Outpatient Physical Therapy 2 times weekly for 8 weeks to include the following interventions: Electrical Stimulation NMES, Gait Training, Manual Therapy, Moist Heat/Ice, Neuromuscular Re-ed, Patient Education, Therapeutic Activities, and Therapeutic Exercise.     MONIE JONAS, PT   09/10/2024        "

## 2024-09-17 ENCOUNTER — CLINICAL SUPPORT (OUTPATIENT)
Dept: REHABILITATION | Facility: HOSPITAL | Age: 59
End: 2024-09-17
Payer: OTHER MISCELLANEOUS

## 2024-09-17 DIAGNOSIS — M17.12 PRIMARY OSTEOARTHRITIS OF LEFT KNEE: ICD-10-CM

## 2024-09-17 DIAGNOSIS — R26.9 GAIT DISTURBANCE: ICD-10-CM

## 2024-09-17 DIAGNOSIS — M62.81 WEAKNESS OF LEFT QUADRICEPS MUSCLE: ICD-10-CM

## 2024-09-17 DIAGNOSIS — M25.662 DECREASED ROM OF LEFT KNEE: ICD-10-CM

## 2024-09-17 DIAGNOSIS — Z98.890 S/P LEFT KNEE ARTHROSCOPY: Primary | ICD-10-CM

## 2024-09-17 PROCEDURE — 97110 THERAPEUTIC EXERCISES: CPT | Mod: CQ

## 2024-09-17 PROCEDURE — 97035 APP MDLTY 1+ULTRASOUND EA 15: CPT | Mod: CQ

## 2024-09-17 NOTE — PROGRESS NOTES
[facility]   Physical Therapy Treatment Note      Name: Ligia Mcgrath  Clinic Number: [id number]    Therapy Diagnosis:   Encounter Diagnoses   Name Primary?    S/P left knee arthroscopy Yes    Primary osteoarthritis of left knee     Decreased ROM of left knee     Weakness of left quadriceps muscle     Gait disturbance      Physician: William Fernandez MD    Visit Date: 9/17/2024    Physician Orders: PT Eval and Treat   Medical Diagnosis from Referral: see above  Evaluation Date: 8/13/2024  Authorization Period Expiration: 7/24/2025  Plan of Care Expiration: 10/11/2024     Date of Surgery: 4/25/2023  Visit # / Visits authorized: 6/11  FOTO: 1/ 3   2/3 = 31     Precautions: Standard    PTA Visit #: 1/5     Time In: 11:41 am  Time Out: 12:15 pm  Total Billable Time: 34 billable minutes    Subjective     Pt reports: Pt reports continued medial left knee pain. He reports 7/8-10  He was compliant with home exercise program.  Response to previous treatment: no complaints   Functional change: none     Pain: 8/10  Location: left knee      Objective      Range of motion:  Motion Right Left    Knee extension   0 degrees     0 degrees    Knee flexion   125 degrees    116 degrees         Treatment     LIGIA received the treatments listed below:      therapeutic exercises to develop strength, endurance, ROM, flexibility, posture, and core stabilization for 26 minutes including:  Quad set --10 x 10 sec  Prone quad set 5 x 10 sec  Straight leg raise w/ focus on no lag--4 x 5 x 5 s/h   Prone quad stretch gr strap 3 x 30 s/h   Nustep/Bike x 5 min      (Not today)  Slant board--2 min  Seated Long Arc Quad --no weight--3 x 10  SAQ--5#--3 x 10    (Not Today)  Hamstring rolls wit swiss ball--2 x 10    manual therapy techniques: Joint mobilizations, Manual traction, Myofacial release, Soft tissue Mobilization, and Friction Massage were applied to the:   L knee for 0 minutes,  "including:      neuromuscular re-education activities to improve: Balance, Coordination, Kinesthetic Sense, Proprioception, and Posture for  0 minutes. The following activities were included:  -      (Not today)  Slant board with QS and shift--10 x 10 sec  Leg Press--6 plates--3 x 10  Single Leg Press--3 plates--2 x 10  Lateral eccentric lowering--2"--4 x 5    therapeutic activities to improve functional performance for  0 minutes, including:    Direct contact modalities: ultrasound 8 min on medial aspect of left knee 50%, 1MHz, 1.2 W/cm2      Patient Education and Home Exercises       Education provided:   - review of home exercise program and current Plan of Care/rationale of treatment.    Written Home Exercises Provided: Patient instructed to cont prior HEP. Exercises were reviewed and LIGIA was able to demonstrate them prior to the end of the session.  LIGIA demonstrated good understanding of the education provided. See EMR under Patient Instructions for exercises provided during therapy sessions    Assessment     At Evaluation:  Ligia is a 58 y.o. male referred to outpatient Physical Therapy with a medical diagnosis of left knee pain. Patient presents with complaints of left knee pain and stiffness since surgery last year. He still lacks full extension and decreased quad strength. His left knee flexion is about 9 degrees less than his right. He does have significant loss of joint space in the medial compartment per radiology report. He has two different bouts of physical therapy but has not been pleased with his outcomes. Will work to restore full range of motion, improve quad strength and control, and decrease pain so patient can function at home and work without pain.     Current Assessment:  Pt arrived with 7-8/10 left knee medial knee pain. Reports pain was better for a couple of days after last session.  Increased pain with stepping on and off truck.  Tolerated all exercises above fairly well. " "Reports compliance with initial HEP. Good ROM and quad control.Continued to focus on ROM and Quad strength will advance as tolerated. Added ultrasound today to stimulate blood flow to affected area. Cont POC.    LIGIA Is progressing towards his goals.   Pt prognosis is Good.     Pt will continue to benefit from skilled outpatient physical therapy to address the deficits listed in the problem list box on initial evaluation, provide pt/family education and to maximize pt's level of independence in the home and community environment.     Pt's spiritual, cultural and educational needs considered and pt agreeable to plan of care and goals.     Anticipated barriers to physical therapy: none    Goals:   SHORT TERM GOALS  1.  Patient to be independent with home exercise program to facilitate carryover between therapy visits.  2.  Patient will have 0-125 degrees range of motion left knee for improved functional mobility.  3.  Patient will perform straight leg raise and hold 10 seconds without quad lag for increased quad control.  4.  Patient will increase manual muscle test of left lower extremity to 5/5 for increased stability with gait and activiites of daily living.     LONG TERM GOALS  1.  Patient will be able to perform a lateral and front step down from 6" height to be able to go up/down stairs reciprocal pattern without handrails and good eccentric control on left lower extremity.  2.  Patient will ambulate independent on even and uneven surfaces without deviation and without pain in left knee.  3.  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     Plan     Plan of care Certification: 8/13/2024 to 10/11/2024.     Outpatient Physical Therapy 2 times weekly for 8 weeks to include the following interventions: Electrical Stimulation NMES, Gait Training, Manual Therapy, Moist Heat/Ice, Neuromuscular Re-ed, Patient Education, Therapeutic Activities, and Therapeutic Exercise. "     Ciro Bass, PTA   09/17/2024

## 2024-09-24 ENCOUNTER — CLINICAL SUPPORT (OUTPATIENT)
Dept: REHABILITATION | Facility: HOSPITAL | Age: 59
End: 2024-09-24
Payer: OTHER MISCELLANEOUS

## 2024-09-24 DIAGNOSIS — M25.662 DECREASED ROM OF LEFT KNEE: ICD-10-CM

## 2024-09-24 DIAGNOSIS — M17.12 PRIMARY OSTEOARTHRITIS OF LEFT KNEE: ICD-10-CM

## 2024-09-24 DIAGNOSIS — Z98.890 S/P LEFT KNEE ARTHROSCOPY: Primary | ICD-10-CM

## 2024-09-24 DIAGNOSIS — R26.9 GAIT DISTURBANCE: ICD-10-CM

## 2024-09-24 DIAGNOSIS — M62.81 WEAKNESS OF LEFT QUADRICEPS MUSCLE: ICD-10-CM

## 2024-09-24 PROCEDURE — 97112 NEUROMUSCULAR REEDUCATION: CPT

## 2024-09-24 PROCEDURE — 97110 THERAPEUTIC EXERCISES: CPT

## 2024-09-24 NOTE — PROGRESS NOTES
[facility]   Physical Therapy Treatment Note      Name: Ligia Mcgrath  Clinic Number: [id number]    Therapy Diagnosis:   Encounter Diagnoses   Name Primary?    S/P left knee arthroscopy Yes    Primary osteoarthritis of left knee     Decreased ROM of left knee     Weakness of left quadriceps muscle     Gait disturbance      Physician: William Fernandez MD    Visit Date: 9/24/2024    Physician Orders: PT Eval and Treat   Medical Diagnosis from Referral: see above  Evaluation Date: 8/13/2024  Authorization Period Expiration: 7/24/2025  Plan of Care Expiration: 10/11/2024     Date of Surgery: 4/25/2023  Visit # / Visits authorized: 7/11  FOTO: 1/ 3   2/3 = 31     Precautions: Standard    PTA Visit #: 0/5     Time In: 11:29 am  Time Out: 12:11  pm  Total Billable Time: 42 billable minutes    Subjective     Pt reports: Pt reports continued medial left knee pain. He reports 7/10 pain. He states that the pain is unchanged.  He was compliant with home exercise program.  Response to previous treatment: no complaints   Functional change: none     Pain: 8/10  Location: left knee      Objective      Range of motion:  Motion Right Left    Knee extension   0 degrees     0 degrees    Knee flexion   125 degrees    116 degrees         Treatment     LIGIA received the treatments listed below:      therapeutic exercises to develop strength, endurance, ROM, flexibility, posture, and core stabilization for 34 minutes including:  Prone quad stretch gr strap 3 x 30 s/h   Nustep/Bike x 5 min  Slant board 3 x 30  Cybex leg extension 5 plates x 25   Terminal knee extension 4 plates x 25  SAQ--5#--2  x 10 x 5 seconds  Supine knee flexion using gr strap 3 x 30  (Not today)  Slant board--2 min  Seated Long Arc Quad --no weight--3 x 10      (Not Today)  Hamstring rolls wit swiss ball--2 x 10    manual therapy techniques: Joint mobilizations, Manual traction, Myofacial release, Soft tissue Mobilization, and  "Friction Massage were applied to the:   L knee for 0 minutes, including:      neuromuscular re-education activities to improve: Balance, Coordination, Kinesthetic Sense, Proprioception, and Posture for  8 minutes. The following activities were included  -  Quad set --10 x 10 sec  Prone quad set 5 x 10 sec  Straight leg raise w/ focus on no lag--4 x 5 x 5 s/h     (Not today)  Slant board with QS and shift--10 x 10 sec  Leg Press--6 plates--3 x 10  Single Leg Press--3 plates--2 x 10  Lateral eccentric lowering--2"--4 x 5    therapeutic activities to improve functional performance for  0 minutes, including:    Direct contact modalities: ultrasound  min on medial aspect of left knee 50%, 1MHz, 1.2 W/cm2      Patient Education and Home Exercises       Education provided:   - review of home exercise program and current Plan of Care/rationale of treatment.    Written Home Exercises Provided: Patient instructed to cont prior HEP. Exercises were reviewed and LIGIA was able to demonstrate them prior to the end of the session.  LIGIA demonstrated good understanding of the education provided. See EMR under Patient Instructions for exercises provided during therapy sessions    Assessment     At Evaluation:  Ligia is a 58 y.o. male referred to outpatient Physical Therapy with a medical diagnosis of left knee pain. Patient presents with complaints of left knee pain and stiffness since surgery last year. He still lacks full extension and decreased quad strength. His left knee flexion is about 9 degrees less than his right. He does have significant loss of joint space in the medial compartment per radiology report. He has two different bouts of physical therapy but has not been pleased with his outcomes. Will work to restore full range of motion, improve quad strength and control, and decrease pain so patient can function at home and work without pain.     Current Assessment:  Pt arrived with 7/10 left knee medial knee pain. " "He says that the pain is unchanged. Reports pain was better for a couple of days after last session due to the ultrasound. He requested to not do ultrasound today due to wearing jeans. Increased pain with stepping on and off truck.  Tolerated all exercises above fairly well. Attempted lateral step downs---had to discontinue due to pain. Added terminal knee extension on machine and cybex knee extensions without pain. Patient requested to have two weeks off to assess how his knee would do without therapy. Patient rescheduled for 10/9. Reports compliance with initial HEP. Good ROM and quad control.Continued to focus on ROM and Quad strength will advance as tolerated. Cont POC.    LIGIA Is progressing towards his goals.   Pt prognosis is Good.     Pt will continue to benefit from skilled outpatient physical therapy to address the deficits listed in the problem list box on initial evaluation, provide pt/family education and to maximize pt's level of independence in the home and community environment.     Pt's spiritual, cultural and educational needs considered and pt agreeable to plan of care and goals.     Anticipated barriers to physical therapy: none    Goals:   SHORT TERM GOALS  1.  Patient to be independent with home exercise program to facilitate carryover between therapy visits.  2.  Patient will have 0-125 degrees range of motion left knee for improved functional mobility.  3.  Patient will perform straight leg raise and hold 10 seconds without quad lag for increased quad control.  4.  Patient will increase manual muscle test of left lower extremity to 5/5 for increased stability with gait and activiites of daily living.     LONG TERM GOALS  1.  Patient will be able to perform a lateral and front step down from 6" height to be able to go up/down stairs reciprocal pattern without handrails and good eccentric control on left lower extremity.  2.  Patient will ambulate independent on even and uneven surfaces " without deviation and without pain in left knee.  3.  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     Plan     Plan of care Certification: 8/13/2024 to 10/11/2024.     Outpatient Physical Therapy 2 times weekly for 8 weeks to include the following interventions: Electrical Stimulation NMES, Gait Training, Manual Therapy, Moist Heat/Ice, Neuromuscular Re-ed, Patient Education, Therapeutic Activities, and Therapeutic Exercise.     Gigi Dos Santos, SPT  MONIE JONAS, PT   09/24/2024

## 2024-11-12 DIAGNOSIS — M25.561 PAIN IN BOTH KNEES, UNSPECIFIED CHRONICITY: Primary | ICD-10-CM

## 2024-11-12 DIAGNOSIS — M25.562 PAIN IN BOTH KNEES, UNSPECIFIED CHRONICITY: Primary | ICD-10-CM

## 2024-11-13 ENCOUNTER — OFFICE VISIT (OUTPATIENT)
Dept: ORTHOPEDICS | Facility: CLINIC | Age: 59
End: 2024-11-13
Payer: OTHER MISCELLANEOUS

## 2024-11-13 ENCOUNTER — HOSPITAL ENCOUNTER (OUTPATIENT)
Dept: RADIOLOGY | Facility: HOSPITAL | Age: 59
Discharge: HOME | End: 2024-11-13
Attending: ORTHOPAEDIC SURGERY
Payer: OTHER MISCELLANEOUS

## 2024-11-13 DIAGNOSIS — M25.561 PAIN IN BOTH KNEES, UNSPECIFIED CHRONICITY: ICD-10-CM

## 2024-11-13 DIAGNOSIS — M25.562 PAIN IN BOTH KNEES, UNSPECIFIED CHRONICITY: ICD-10-CM

## 2024-11-13 DIAGNOSIS — M17.12 PRIMARY OSTEOARTHRITIS OF LEFT KNEE: Primary | ICD-10-CM

## 2024-11-13 PROCEDURE — 73562 X-RAY EXAM OF KNEE 3: CPT | Mod: TC,50

## 2024-11-13 PROCEDURE — 99213 OFFICE O/P EST LOW 20 MIN: CPT | Mod: PBBFAC,25 | Performed by: ORTHOPAEDIC SURGERY

## 2024-11-13 PROCEDURE — 99214 OFFICE O/P EST MOD 30 MIN: CPT | Mod: S$PBB,,, | Performed by: ORTHOPAEDIC SURGERY

## 2024-11-13 PROCEDURE — 99999 PR PBB SHADOW E&M-EST. PATIENT-LVL III: CPT | Mod: PBBFAC,,, | Performed by: ORTHOPAEDIC SURGERY

## 2024-11-13 RX ORDER — DICLOFENAC SODIUM 75 MG/1
75 TABLET, DELAYED RELEASE ORAL 2 TIMES DAILY
Qty: 60 TABLET | Refills: 1 | Status: SHIPPED | OUTPATIENT
Start: 2024-11-13

## 2024-11-13 NOTE — PROGRESS NOTES
CLINIC NOTE       Chief Complaint   Patient presents with    Left Knee - Pain        Joe Mcgrath is a 59 y.o. male seen today for recheck of his left knee.  He underwent left knee arthroscopy April of 2023 for acute meniscal tear.  This was superimposed on significant medial compartment DJD.  Continued to have medial joint symptoms related to the osteoarthritis.  He was had the benefit of oral anti-inflammatory medications, periodic steroid injections and viscosupplementation injection series.  Currently using Voltaren gel topically.  He was brought to my attention some depigmentation involving he was right hand and forearm.  He wondered if it was related to the use of Voltaren gel.  Advised him I believe that he was having symptoms of vitiligo.  He was given a contact for Dr. Frandy Monique (dermatology).      Past Medical History:   Diagnosis Date    Asthma     Chronic back pain     HTN (hypertension)      Family History   Problem Relation Name Age of Onset    Hypertension Mother      Heart disease Mother      Diabetes Mother      Osteoarthritis Mother      Heart disease Father       Current Outpatient Medications on File Prior to Visit   Medication Sig Dispense Refill    amLODIPine (NORVASC) 10 MG tablet Take 10 mg by mouth once daily.      diclofenac sodium (VOLTAREN) 1 % Gel Apply 4 g topically 4 (four) times daily. 500 g 2    fluticasone furoate-vilanteroL (BREO ELLIPTA) 100-25 mcg/dose diskus inhaler 1 puff.      gabapentin (NEURONTIN) 300 MG capsule Take 600 mg by mouth once daily.      lisinopriL (PRINIVIL,ZESTRIL) 40 MG tablet Take 40 mg by mouth once daily.      meloxicam (MOBIC) 15 MG tablet Take 1 tablet (15 mg total) by mouth once daily. 30 tablet 2    metoprolol succinate (TOPROL-XL) 25 MG 24 hr tablet Take 25 mg by mouth.      spironolactone (ALDACTONE) 25 MG tablet 1 tablet.       No current facility-administered medications on file prior to visit.       ROS     There were no vitals filed for  this visit.    Past Surgical History:   Procedure Laterality Date    ARTHROSCOPY OF KNEE Left 4/25/2023    Procedure: ARTHROSCOPY, KNEE;  Surgeon: William Fernandez MD;  Location: [facility] OR;  Service: Orthopedics;  Laterality: Left;    BACK SURGERY      KNEE ARTHROSCOPY W/ MENISCECTOMY Left 4/25/2023    Procedure: ARTHROSCOPY, KNEE, WITH MENISCECTOMY;  Surgeon: William Fernandez MD;  Location: [facility] OR;  Service: Orthopedics;  Laterality: Left;        Review of patient's allergies indicates:  No Known Allergies     Ortho Exam     Radiographic Examination:  Left knee 11/13/2024    Technique:  Three views AP lateral and patellar    Findings:  Bones well mineralized.  He was evidence of moderately severe tricompartmental DJD.    Impression:   See Above    Assessment and Plan  Patient Active Problem List    Diagnosis Date Noted    Decreased ROM of left knee 08/15/2024    Weakness of left quadriceps muscle 08/15/2024    Gait disturbance 08/15/2024    S/P left knee arthroscopy 05/11/2023    Primary osteoarthritis of left knee 05/11/2023    Hyperglycemia 01/10/2023    Increased creatine kinase level 01/10/2023    Mild persistent asthma without complication 01/10/2023    Mixed hyperlipidemia 01/10/2023    Screening for malignant neoplasm of prostate 01/10/2023    Stage 2 chronic kidney disease 01/10/2023    Acute medial meniscus tear of left knee 11/09/2022    Dyspnea on exertion 10/12/2022    Essential hypertension 10/12/2022    Osteoarthritis 10/12/2022    Other long term (current) drug therapy 10/12/2022    Internal derangement of left knee 10/12/2022    Impression DJD left knee with medial gonarthrosis  Plan:  Discussed continued conservative management versus total knee replacement arthroplasty.  He was shown TKR procedure using models.  He expressed concerns indicating that he was relatives in some acquaintances that did not do well with TKR.  I have discussed the potential complications and  generalized success rate.  He was again hoping to forestall TKR.  He was offered trial of diclofenac.    William Fernandez M.D.

## 2025-01-30 ENCOUNTER — TELEPHONE (OUTPATIENT)
Dept: ORTHOPEDICS | Facility: CLINIC | Age: 60
End: 2025-01-30
Payer: OTHER MISCELLANEOUS

## 2025-01-30 NOTE — TELEPHONE ENCOUNTER
Call patient to reschedule his appt. With Dr. Fernandez. Patient stated he will call back and schedule the appt.

## 2025-03-12 ENCOUNTER — OFFICE VISIT (OUTPATIENT)
Dept: ORTHOPEDICS | Facility: CLINIC | Age: 60
End: 2025-03-12
Payer: OTHER MISCELLANEOUS

## 2025-03-12 DIAGNOSIS — M17.0 PRIMARY OSTEOARTHRITIS OF BOTH KNEES: Primary | ICD-10-CM

## 2025-03-12 PROCEDURE — 99999PBSHW PR PBB SHADOW TECHNICAL ONLY FILED TO HB: Mod: PBBFAC,,,

## 2025-03-12 PROCEDURE — 99999 PR PBB SHADOW E&M-EST. PATIENT-LVL III: CPT | Mod: PBBFAC,,, | Performed by: ORTHOPAEDIC SURGERY

## 2025-03-12 PROCEDURE — 20610 DRAIN/INJ JOINT/BURSA W/O US: CPT | Mod: PBBFAC | Performed by: ORTHOPAEDIC SURGERY

## 2025-03-12 PROCEDURE — 99213 OFFICE O/P EST LOW 20 MIN: CPT | Mod: PBBFAC | Performed by: ORTHOPAEDIC SURGERY

## 2025-03-12 RX ORDER — BUPIVACAINE HYDROCHLORIDE 5 MG/ML
1 INJECTION, SOLUTION PERINEURAL
Status: COMPLETED | OUTPATIENT
Start: 2025-03-12 | End: 2025-03-12

## 2025-03-12 RX ORDER — DICLOFENAC SODIUM 75 MG/1
75 TABLET, DELAYED RELEASE ORAL 2 TIMES DAILY
Qty: 60 TABLET | Refills: 1 | Status: SHIPPED | OUTPATIENT
Start: 2025-03-12

## 2025-03-12 RX ORDER — TRIAMCINOLONE ACETONIDE 40 MG/ML
40 INJECTION, SUSPENSION INTRA-ARTICULAR; INTRAMUSCULAR
Status: COMPLETED | OUTPATIENT
Start: 2025-03-12 | End: 2025-03-12

## 2025-03-12 RX ADMIN — BUPIVACAINE HYDROCHLORIDE 5 MG: 5 INJECTION, SOLUTION PERINEURAL at 04:03

## 2025-03-12 RX ADMIN — TRIAMCINOLONE ACETONIDE 40 MG: 40 INJECTION, SUSPENSION INTRA-ARTICULAR; INTRAMUSCULAR at 04:03

## 2025-03-12 NOTE — PROGRESS NOTES
CLINIC NOTE       Chief Complaint   Patient presents with    Left Knee - Follow-up        Joe Mcgrath is a 59 y.o. male seen today for recheck of his left knee.  He has been followed for DJD with the greatest narrowing of the medial compartment.  He has pain in his slowly progressed.  He is given a trial of diclofenac last visit but did not pick it up from the pharmacy.  We have again discussed likelihood he is facing total knee replacement arthroplasty when he feels symptoms are refractory to conservative management.      Past Medical History:   Diagnosis Date    Asthma     Chronic back pain     HTN (hypertension)      Family History   Problem Relation Name Age of Onset    Hypertension Mother      Heart disease Mother      Diabetes Mother      Osteoarthritis Mother      Heart disease Father       Medications Ordered Prior to Encounter[1]    ROS     There were no vitals filed for this visit.    Past Surgical History:   Procedure Laterality Date    ARTHROSCOPY OF KNEE Left 4/25/2023    Procedure: ARTHROSCOPY, KNEE;  Surgeon: William Fernandez MD;  Location: [facility];  Service: Orthopedics;  Laterality: Left;    BACK SURGERY      KNEE ARTHROSCOPY W/ MENISCECTOMY Left 4/25/2023    Procedure: ARTHROSCOPY, KNEE, WITH MENISCECTOMY;  Surgeon: William Fernandez MD;  Location: [facility] OR;  Service: Orthopedics;  Laterality: Left;        Review of patient's allergies indicates:  No Known Allergies     Ortho Exam :  Left knee shows moderate intra-articular effusion.    Radiographic Examination:    Technique:    Findings:    Impression:   See Above    Assessment and Plan  Patient Active Problem List    Diagnosis Date Noted    Decreased ROM of left knee 08/15/2024    Weakness of left quadriceps muscle 08/15/2024    Gait disturbance 08/15/2024    S/P left knee arthroscopy 05/11/2023    Primary osteoarthritis of left knee 05/11/2023    Hyperglycemia 01/10/2023    Increased creatine kinase level  01/10/2023    Mild persistent asthma without complication 01/10/2023    Mixed hyperlipidemia 01/10/2023    Screening for malignant neoplasm of prostate 01/10/2023    Stage 2 chronic kidney disease 01/10/2023    Acute medial meniscus tear of left knee 11/09/2022    Dyspnea on exertion 10/12/2022    Essential hypertension 10/12/2022    Osteoarthritis 10/12/2022    Other long term (current) drug therapy 10/12/2022    Internal derangement of left knee 10/12/2022    Impression:  DJD left knee   Plan:  Left knee was prepped with Betadine and arthrocentesis performed yielding 34 cc of xanthochromic fluid.  1 cc of Celestone and Marcaine were instilled in the joint.  The right knee was then prepped with Betadine intra-articular steroid injection performed with triamcinolone and Marcaine 1 cc each.  Rx diclofenac 75 mg p.o. b.i.d. p.r.n..  Recheck as needed.      William Fernandez M.D.                   [1]   Current Outpatient Medications on File Prior to Visit   Medication Sig Dispense Refill    amLODIPine (NORVASC) 10 MG tablet Take 10 mg by mouth once daily.      diclofenac (VOLTAREN) 75 MG EC tablet Take 1 tablet (75 mg total) by mouth 2 (two) times daily. 60 tablet 1    diclofenac sodium (VOLTAREN) 1 % Gel Apply 4 g topically 4 (four) times daily. 500 g 2    fluticasone furoate-vilanteroL (BREO ELLIPTA) 100-25 mcg/dose diskus inhaler 1 puff.      gabapentin (NEURONTIN) 300 MG capsule Take 600 mg by mouth once daily.      lisinopriL (PRINIVIL,ZESTRIL) 40 MG tablet Take 40 mg by mouth once daily.      meloxicam (MOBIC) 15 MG tablet Take 1 tablet (15 mg total) by mouth once daily. 30 tablet 2    metoprolol succinate (TOPROL-XL) 25 MG 24 hr tablet Take 25 mg by mouth.      spironolactone (ALDACTONE) 25 MG tablet 1 tablet.       No current facility-administered medications on file prior to visit.

## 2025-07-23 ENCOUNTER — OFFICE VISIT (OUTPATIENT)
Dept: ORTHOPEDICS | Facility: CLINIC | Age: 60
End: 2025-07-23
Payer: OTHER MISCELLANEOUS

## 2025-07-23 ENCOUNTER — HOSPITAL ENCOUNTER (OUTPATIENT)
Dept: RADIOLOGY | Facility: HOSPITAL | Age: 60
Discharge: HOME | End: 2025-07-23
Attending: ORTHOPAEDIC SURGERY
Payer: OTHER MISCELLANEOUS

## 2025-07-23 VITALS
HEART RATE: 78 BPM | OXYGEN SATURATION: 97 % | DIASTOLIC BLOOD PRESSURE: 93 MMHG | SYSTOLIC BLOOD PRESSURE: 147 MMHG | BODY MASS INDEX: 29.96 KG/M2 | WEIGHT: 214 LBS | HEIGHT: 71 IN

## 2025-07-23 DIAGNOSIS — M17.12 PRIMARY OSTEOARTHRITIS OF LEFT KNEE: Primary | ICD-10-CM

## 2025-07-23 DIAGNOSIS — M17.12 PRIMARY OSTEOARTHRITIS OF LEFT KNEE: ICD-10-CM

## 2025-07-23 DIAGNOSIS — M25.551 RIGHT HIP PAIN: ICD-10-CM

## 2025-07-23 PROCEDURE — 73562 X-RAY EXAM OF KNEE 3: CPT | Mod: TC,LT

## 2025-07-23 PROCEDURE — 20610 DRAIN/INJ JOINT/BURSA W/O US: CPT | Mod: PBBFAC,LT | Performed by: ORTHOPAEDIC SURGERY

## 2025-07-23 PROCEDURE — 99999PBSHW PR PBB SHADOW TECHNICAL ONLY FILED TO HB: Mod: PBBFAC,,,

## 2025-07-23 PROCEDURE — 99214 OFFICE O/P EST MOD 30 MIN: CPT | Mod: PBBFAC,25 | Performed by: ORTHOPAEDIC SURGERY

## 2025-07-23 PROCEDURE — 20610 DRAIN/INJ JOINT/BURSA W/O US: CPT | Mod: S$PBB,LT,, | Performed by: ORTHOPAEDIC SURGERY

## 2025-07-23 PROCEDURE — 99214 OFFICE O/P EST MOD 30 MIN: CPT | Mod: S$PBB,25,, | Performed by: ORTHOPAEDIC SURGERY

## 2025-07-23 PROCEDURE — 99999 PR PBB SHADOW E&M-EST. PATIENT-LVL IV: CPT | Mod: PBBFAC,,, | Performed by: ORTHOPAEDIC SURGERY

## 2025-07-23 RX ORDER — TRIAMCINOLONE ACETONIDE 40 MG/ML
40 INJECTION, SUSPENSION INTRA-ARTICULAR; INTRAMUSCULAR
Status: COMPLETED | OUTPATIENT
Start: 2025-07-23 | End: 2025-07-23

## 2025-07-23 RX ORDER — BUPIVACAINE HYDROCHLORIDE 5 MG/ML
1 INJECTION, SOLUTION PERINEURAL
Status: COMPLETED | OUTPATIENT
Start: 2025-07-23 | End: 2025-07-23

## 2025-07-23 RX ADMIN — TRIAMCINOLONE ACETONIDE 40 MG: 40 INJECTION, SUSPENSION INTRA-ARTICULAR; INTRAMUSCULAR at 03:07

## 2025-07-23 RX ADMIN — BUPIVACAINE HYDROCHLORIDE 5 MG: 5 INJECTION, SOLUTION PERINEURAL at 03:07

## (undated) DEVICE — APPLICATOR CHLORAPREP ORN 26ML

## (undated) DEVICE — PACK KNEE ARTHROSCOPY  RUSH

## (undated) DEVICE — PAD ABDOMINAL 8X7.5 STERILE

## (undated) DEVICE — TUBING CROSSFLOW INFLOW CASS

## (undated) DEVICE — PROBE AARDVARK SUC 0.5X135MM

## (undated) DEVICE — GLOVE BIOGEL SKINSENSE PI 7.5

## (undated) DEVICE — DEVICE SUCTION H20 BROOM 12FT

## (undated) DEVICE — SHAVER TOMCAT 4.0

## (undated) DEVICE — BANDAGE ACE DOUBLE STER 6IN

## (undated) DEVICE — TOURNIQUET SB QC SP 34X4IN

## (undated) DEVICE — GOWN POLY REINF BRTH SLV XL

## (undated) DEVICE — GLOVE 8 PROTEXIS PI BLUE

## (undated) DEVICE — SOL NACL IRR 3000ML

## (undated) DEVICE — CANISTER SUCTION MEDI-VAC 12L